# Patient Record
Sex: MALE | Race: WHITE | Employment: OTHER | URBAN - NONMETROPOLITAN AREA
[De-identification: names, ages, dates, MRNs, and addresses within clinical notes are randomized per-mention and may not be internally consistent; named-entity substitution may affect disease eponyms.]

---

## 2022-06-16 ENCOUNTER — APPOINTMENT (OUTPATIENT)
Dept: CT IMAGING | Age: 57
DRG: 377 | End: 2022-06-16
Payer: MEDICARE

## 2022-06-16 ENCOUNTER — HOSPITAL ENCOUNTER (OUTPATIENT)
Age: 57
Setting detail: OBSERVATION
Discharge: HOME | DRG: 377 | End: 2022-06-18
Attending: EMERGENCY MEDICINE | Admitting: INTERNAL MEDICINE
Payer: MEDICARE

## 2022-06-16 ENCOUNTER — APPOINTMENT (OUTPATIENT)
Dept: GENERAL RADIOLOGY | Age: 57
DRG: 377 | End: 2022-06-16
Payer: MEDICARE

## 2022-06-16 DIAGNOSIS — R10.84 GENERALIZED ABDOMINAL PAIN: Primary | ICD-10-CM

## 2022-06-16 LAB
A/G RATIO: 0.6 (ref 1.1–2.2)
ALBUMIN SERPL-MCNC: 2.9 G/DL (ref 3.4–5)
ALP BLD-CCNC: 123 U/L (ref 40–129)
ALT SERPL-CCNC: 27 U/L (ref 10–40)
AMMONIA: 80 UMOL/L (ref 16–60)
ANION GAP SERPL CALCULATED.3IONS-SCNC: 12 MMOL/L (ref 3–16)
AST SERPL-CCNC: 145 U/L (ref 15–37)
BASOPHILS ABSOLUTE: 0.1 K/UL (ref 0–0.2)
BASOPHILS RELATIVE PERCENT: 1.4 %
BILIRUB SERPL-MCNC: 1.3 MG/DL (ref 0–1)
BILIRUBIN URINE: NEGATIVE
BLOOD, URINE: NEGATIVE
BUN BLDV-MCNC: 13 MG/DL (ref 7–20)
CALCIUM SERPL-MCNC: 9.1 MG/DL (ref 8.3–10.6)
CHLORIDE BLD-SCNC: 101 MMOL/L (ref 99–110)
CLARITY: CLEAR
CO2: 25 MMOL/L (ref 21–32)
COLOR: YELLOW
CREAT SERPL-MCNC: 1.3 MG/DL (ref 0.9–1.3)
EOSINOPHILS ABSOLUTE: 0.1 K/UL (ref 0–0.6)
EOSINOPHILS RELATIVE PERCENT: 1.1 %
GFR AFRICAN AMERICAN: >60
GFR NON-AFRICAN AMERICAN: 57
GLUCOSE BLD-MCNC: 101 MG/DL (ref 70–99)
GLUCOSE URINE: NEGATIVE MG/DL
HCT VFR BLD CALC: 39.9 % (ref 40.5–52.5)
HEMOGLOBIN: 13.7 G/DL (ref 13.5–17.5)
INR BLD: 1.23 (ref 0.87–1.14)
KETONES, URINE: NEGATIVE MG/DL
LACTIC ACID: 1.6 MMOL/L (ref 0.4–2)
LEUKOCYTE ESTERASE, URINE: NEGATIVE
LIPASE: 90 U/L (ref 13–60)
LYMPHOCYTES ABSOLUTE: 1.5 K/UL (ref 1–5.1)
LYMPHOCYTES RELATIVE PERCENT: 25 %
MAGNESIUM: 1.9 MG/DL (ref 1.8–2.4)
MCH RBC QN AUTO: 35.7 PG (ref 26–34)
MCHC RBC AUTO-ENTMCNC: 34.3 G/DL (ref 31–36)
MCV RBC AUTO: 104 FL (ref 80–100)
MICROSCOPIC EXAMINATION: NORMAL
MONOCYTES ABSOLUTE: 0.7 K/UL (ref 0–1.3)
MONOCYTES RELATIVE PERCENT: 11.9 %
NEUTROPHILS ABSOLUTE: 3.7 K/UL (ref 1.7–7.7)
NEUTROPHILS RELATIVE PERCENT: 60.6 %
NITRITE, URINE: NEGATIVE
PDW BLD-RTO: 16.6 % (ref 12.4–15.4)
PH UA: 7.5 (ref 5–8)
PLATELET # BLD: 210 K/UL (ref 135–450)
PMV BLD AUTO: 8.5 FL (ref 5–10.5)
POTASSIUM SERPL-SCNC: 5.1 MMOL/L (ref 3.5–5.1)
PROTEIN UA: NEGATIVE MG/DL
PROTHROMBIN TIME: 15.4 SEC (ref 11.7–14.5)
RBC # BLD: 3.84 M/UL (ref 4.2–5.9)
SARS-COV-2, NAAT: NOT DETECTED
SODIUM BLD-SCNC: 138 MMOL/L (ref 136–145)
SPECIFIC GRAVITY UA: <=1.005 (ref 1–1.03)
TOTAL PROTEIN: 8 G/DL (ref 6.4–8.2)
URINE REFLEX TO CULTURE: NORMAL
URINE TYPE: NORMAL
UROBILINOGEN, URINE: 1 E.U./DL
WBC # BLD: 6.2 K/UL (ref 4–11)

## 2022-06-16 PROCEDURE — 6360000004 HC RX CONTRAST MEDICATION: Performed by: EMERGENCY MEDICINE

## 2022-06-16 PROCEDURE — 93005 ELECTROCARDIOGRAM TRACING: CPT | Performed by: EMERGENCY MEDICINE

## 2022-06-16 PROCEDURE — 83690 ASSAY OF LIPASE: CPT

## 2022-06-16 PROCEDURE — 96375 TX/PRO/DX INJ NEW DRUG ADDON: CPT

## 2022-06-16 PROCEDURE — 80053 COMPREHEN METABOLIC PANEL: CPT

## 2022-06-16 PROCEDURE — 6360000002 HC RX W HCPCS: Performed by: EMERGENCY MEDICINE

## 2022-06-16 PROCEDURE — 36415 COLL VENOUS BLD VENIPUNCTURE: CPT

## 2022-06-16 PROCEDURE — 85025 COMPLETE CBC W/AUTO DIFF WBC: CPT

## 2022-06-16 PROCEDURE — 81003 URINALYSIS AUTO W/O SCOPE: CPT

## 2022-06-16 PROCEDURE — 96376 TX/PRO/DX INJ SAME DRUG ADON: CPT

## 2022-06-16 PROCEDURE — 2500000003 HC RX 250 WO HCPCS: Performed by: STUDENT IN AN ORGANIZED HEALTH CARE EDUCATION/TRAINING PROGRAM

## 2022-06-16 PROCEDURE — 2580000003 HC RX 258: Performed by: EMERGENCY MEDICINE

## 2022-06-16 PROCEDURE — 96365 THER/PROPH/DIAG IV INF INIT: CPT

## 2022-06-16 PROCEDURE — 96361 HYDRATE IV INFUSION ADD-ON: CPT

## 2022-06-16 PROCEDURE — 71045 X-RAY EXAM CHEST 1 VIEW: CPT

## 2022-06-16 PROCEDURE — 87635 SARS-COV-2 COVID-19 AMP PRB: CPT

## 2022-06-16 PROCEDURE — 83735 ASSAY OF MAGNESIUM: CPT

## 2022-06-16 PROCEDURE — 96372 THER/PROPH/DIAG INJ SC/IM: CPT

## 2022-06-16 PROCEDURE — 85610 PROTHROMBIN TIME: CPT

## 2022-06-16 PROCEDURE — 82140 ASSAY OF AMMONIA: CPT

## 2022-06-16 PROCEDURE — 74177 CT ABD & PELVIS W/CONTRAST: CPT

## 2022-06-16 PROCEDURE — 83605 ASSAY OF LACTIC ACID: CPT

## 2022-06-16 PROCEDURE — 99285 EMERGENCY DEPT VISIT HI MDM: CPT

## 2022-06-16 RX ORDER — FUROSEMIDE 20 MG/1
20 TABLET ORAL 2 TIMES DAILY
Status: ON HOLD | COMMUNITY
End: 2022-06-18 | Stop reason: SDUPTHER

## 2022-06-16 RX ORDER — POLYETHYLENE GLYCOL 3350 17 G/17G
17 POWDER, FOR SOLUTION ORAL DAILY
COMMUNITY
End: 2022-06-20

## 2022-06-16 RX ORDER — ONDANSETRON 2 MG/ML
4 INJECTION INTRAMUSCULAR; INTRAVENOUS ONCE
Status: COMPLETED | OUTPATIENT
Start: 2022-06-16 | End: 2022-06-16

## 2022-06-16 RX ORDER — METHION/INOS/CHOL BT/B COM/LIV 110MG-86MG
CAPSULE ORAL
COMMUNITY

## 2022-06-16 RX ORDER — FOLIC ACID 1 MG/1
1 TABLET ORAL DAILY
COMMUNITY

## 2022-06-16 RX ORDER — SPIRONOLACTONE 50 MG/1
50 TABLET, FILM COATED ORAL DAILY
COMMUNITY

## 2022-06-16 RX ORDER — LIDOCAINE HYDROCHLORIDE AND EPINEPHRINE 10; 10 MG/ML; UG/ML
20 INJECTION, SOLUTION INFILTRATION; PERINEURAL ONCE
Status: COMPLETED | OUTPATIENT
Start: 2022-06-16 | End: 2022-06-16

## 2022-06-16 RX ORDER — 0.9 % SODIUM CHLORIDE 0.9 %
1000 INTRAVENOUS SOLUTION INTRAVENOUS ONCE
Status: COMPLETED | OUTPATIENT
Start: 2022-06-16 | End: 2022-06-17

## 2022-06-16 RX ORDER — ENTECAVIR 0.5 MG/1
1 TABLET, FILM COATED ORAL DAILY
COMMUNITY
End: 2022-06-20

## 2022-06-16 RX ORDER — MORPHINE SULFATE 4 MG/ML
4 INJECTION, SOLUTION INTRAMUSCULAR; INTRAVENOUS ONCE
Status: COMPLETED | OUTPATIENT
Start: 2022-06-16 | End: 2022-06-16

## 2022-06-16 RX ORDER — PANTOPRAZOLE SODIUM 40 MG/1
40 TABLET, DELAYED RELEASE ORAL DAILY
Status: ON HOLD | COMMUNITY
End: 2022-06-18 | Stop reason: HOSPADM

## 2022-06-16 RX ORDER — IBUPROFEN 400 MG/1
400 TABLET ORAL EVERY 6 HOURS PRN
Status: ON HOLD | COMMUNITY
End: 2022-06-18 | Stop reason: HOSPADM

## 2022-06-16 RX ORDER — OMEPRAZOLE 20 MG/1
20 CAPSULE, DELAYED RELEASE ORAL DAILY
Status: ON HOLD | COMMUNITY
End: 2022-06-22 | Stop reason: HOSPADM

## 2022-06-16 RX ORDER — MORPHINE SULFATE 4 MG/ML
4 INJECTION, SOLUTION INTRAMUSCULAR; INTRAVENOUS ONCE
Status: COMPLETED | OUTPATIENT
Start: 2022-06-17 | End: 2022-06-17

## 2022-06-16 RX ORDER — ONDANSETRON 4 MG/1
4 TABLET, FILM COATED ORAL EVERY 8 HOURS PRN
COMMUNITY
End: 2022-06-20

## 2022-06-16 RX ORDER — TENOFOVIR DISOPROXIL FUMARATE 300 MG/1
300 TABLET, FILM COATED ORAL DAILY
COMMUNITY

## 2022-06-16 RX ORDER — DOCUSATE SODIUM 100 MG/1
100 CAPSULE, LIQUID FILLED ORAL 2 TIMES DAILY
COMMUNITY
End: 2022-06-20

## 2022-06-16 RX ORDER — HYDROXYZINE PAMOATE 50 MG/1
50 CAPSULE ORAL 3 TIMES DAILY PRN
COMMUNITY
End: 2022-06-20

## 2022-06-16 RX ADMIN — LIDOCAINE HYDROCHLORIDE,EPINEPHRINE BITARTRATE 20 ML: 10; .01 INJECTION, SOLUTION INFILTRATION; PERINEURAL at 22:29

## 2022-06-16 RX ADMIN — IOPAMIDOL 75 ML: 755 INJECTION, SOLUTION INTRAVENOUS at 18:36

## 2022-06-16 RX ADMIN — ONDANSETRON HYDROCHLORIDE 4 MG: 2 INJECTION, SOLUTION INTRAMUSCULAR; INTRAVENOUS at 18:00

## 2022-06-16 RX ADMIN — SODIUM CHLORIDE 1000 ML: 9 INJECTION, SOLUTION INTRAVENOUS at 18:00

## 2022-06-16 RX ADMIN — MORPHINE SULFATE 4 MG: 4 INJECTION INTRAVENOUS at 18:00

## 2022-06-16 RX ADMIN — CEFTRIAXONE SODIUM 1000 MG: 1 INJECTION, POWDER, FOR SOLUTION INTRAMUSCULAR; INTRAVENOUS at 22:28

## 2022-06-16 ASSESSMENT — PAIN SCALES - GENERAL
PAINLEVEL_OUTOF10: 6
PAINLEVEL_OUTOF10: 7
PAINLEVEL_OUTOF10: 8

## 2022-06-16 ASSESSMENT — PAIN DESCRIPTION - LOCATION
LOCATION: ABDOMEN

## 2022-06-16 ASSESSMENT — PAIN DESCRIPTION - FREQUENCY
FREQUENCY: CONTINUOUS

## 2022-06-16 ASSESSMENT — PAIN - FUNCTIONAL ASSESSMENT: PAIN_FUNCTIONAL_ASSESSMENT: 0-10

## 2022-06-16 NOTE — ED NOTES
Patient state that he is on about 10 meds for his liver, but does not know what they are.  Heis not from this area, and his pharmacy is not local.     Jimmy Colvin RN  06/16/22 4786

## 2022-06-17 LAB
A/G RATIO: 0.8 (ref 1.1–2.2)
ALBUMIN SERPL-MCNC: 3.2 G/DL (ref 3.4–5)
ALP BLD-CCNC: 121 U/L (ref 40–129)
ALT SERPL-CCNC: 25 U/L (ref 10–40)
ANION GAP SERPL CALCULATED.3IONS-SCNC: 12 MMOL/L (ref 3–16)
AST SERPL-CCNC: 114 U/L (ref 15–37)
BASOPHILS ABSOLUTE: 0.1 K/UL (ref 0–0.2)
BASOPHILS RELATIVE PERCENT: 2.1 %
BILIRUB SERPL-MCNC: 1.4 MG/DL (ref 0–1)
BUN BLDV-MCNC: 10 MG/DL (ref 7–20)
CALCIUM SERPL-MCNC: 8.8 MG/DL (ref 8.3–10.6)
CHLORIDE BLD-SCNC: 102 MMOL/L (ref 99–110)
CO2: 23 MMOL/L (ref 21–32)
CREAT SERPL-MCNC: 1.2 MG/DL (ref 0.9–1.3)
EKG ATRIAL RATE: 91 BPM
EKG DIAGNOSIS: NORMAL
EKG P AXIS: 31 DEGREES
EKG P-R INTERVAL: 154 MS
EKG Q-T INTERVAL: 400 MS
EKG QRS DURATION: 86 MS
EKG QTC CALCULATION (BAZETT): 492 MS
EKG R AXIS: -44 DEGREES
EKG T AXIS: 26 DEGREES
EKG VENTRICULAR RATE: 91 BPM
EOSINOPHILS ABSOLUTE: 0.1 K/UL (ref 0–0.6)
EOSINOPHILS RELATIVE PERCENT: 1.8 %
GFR AFRICAN AMERICAN: >60
GFR NON-AFRICAN AMERICAN: >60
GLUCOSE BLD-MCNC: 115 MG/DL (ref 70–99)
HCT VFR BLD CALC: 39.2 % (ref 40.5–52.5)
HEMOGLOBIN: 13.2 G/DL (ref 13.5–17.5)
LACTIC ACID: 1.9 MMOL/L (ref 0.4–2)
LYMPHOCYTES ABSOLUTE: 1.7 K/UL (ref 1–5.1)
LYMPHOCYTES RELATIVE PERCENT: 31.3 %
MAGNESIUM: 2 MG/DL (ref 1.8–2.4)
MCH RBC QN AUTO: 35.5 PG (ref 26–34)
MCHC RBC AUTO-ENTMCNC: 33.7 G/DL (ref 31–36)
MCV RBC AUTO: 105.2 FL (ref 80–100)
MONOCYTES ABSOLUTE: 0.6 K/UL (ref 0–1.3)
MONOCYTES RELATIVE PERCENT: 11.5 %
NEUTROPHILS ABSOLUTE: 3 K/UL (ref 1.7–7.7)
NEUTROPHILS RELATIVE PERCENT: 53.3 %
PDW BLD-RTO: 16.6 % (ref 12.4–15.4)
PLATELET # BLD: 222 K/UL (ref 135–450)
PMV BLD AUTO: 7.9 FL (ref 5–10.5)
POTASSIUM REFLEX MAGNESIUM: 3.5 MMOL/L (ref 3.5–5.1)
RBC # BLD: 3.72 M/UL (ref 4.2–5.9)
SODIUM BLD-SCNC: 137 MMOL/L (ref 136–145)
TOTAL PROTEIN: 7.3 G/DL (ref 6.4–8.2)
TROPONIN: <0.01 NG/ML
WBC # BLD: 5.6 K/UL (ref 4–11)

## 2022-06-17 PROCEDURE — 6370000000 HC RX 637 (ALT 250 FOR IP): Performed by: NURSE PRACTITIONER

## 2022-06-17 PROCEDURE — 6370000000 HC RX 637 (ALT 250 FOR IP): Performed by: INTERNAL MEDICINE

## 2022-06-17 PROCEDURE — 96376 TX/PRO/DX INJ SAME DRUG ADON: CPT

## 2022-06-17 PROCEDURE — 86707 HEPATITIS BE ANTIBODY: CPT

## 2022-06-17 PROCEDURE — 99220 PR INITIAL OBSERVATION CARE/DAY 70 MINUTES: CPT | Performed by: INTERNAL MEDICINE

## 2022-06-17 PROCEDURE — 6360000002 HC RX W HCPCS: Performed by: INTERNAL MEDICINE

## 2022-06-17 PROCEDURE — 6360000002 HC RX W HCPCS: Performed by: NURSE PRACTITIONER

## 2022-06-17 PROCEDURE — 87341 HEP B SURFACE AG NEUTRLZJ IA: CPT

## 2022-06-17 PROCEDURE — 96367 TX/PROPH/DG ADDL SEQ IV INF: CPT

## 2022-06-17 PROCEDURE — 87350 HEPATITIS BE AG IA: CPT

## 2022-06-17 PROCEDURE — 36415 COLL VENOUS BLD VENIPUNCTURE: CPT

## 2022-06-17 PROCEDURE — 83605 ASSAY OF LACTIC ACID: CPT

## 2022-06-17 PROCEDURE — 6360000002 HC RX W HCPCS: Performed by: EMERGENCY MEDICINE

## 2022-06-17 PROCEDURE — 96366 THER/PROPH/DIAG IV INF ADDON: CPT

## 2022-06-17 PROCEDURE — 2580000003 HC RX 258: Performed by: INTERNAL MEDICINE

## 2022-06-17 PROCEDURE — 87517 HEPATITIS B DNA QUANT: CPT

## 2022-06-17 PROCEDURE — 87340 HEPATITIS B SURFACE AG IA: CPT

## 2022-06-17 PROCEDURE — G0378 HOSPITAL OBSERVATION PER HR: HCPCS

## 2022-06-17 PROCEDURE — 86706 HEP B SURFACE ANTIBODY: CPT

## 2022-06-17 PROCEDURE — 96372 THER/PROPH/DIAG INJ SC/IM: CPT

## 2022-06-17 PROCEDURE — 80053 COMPREHEN METABOLIC PANEL: CPT

## 2022-06-17 PROCEDURE — 83735 ASSAY OF MAGNESIUM: CPT

## 2022-06-17 PROCEDURE — 2580000003 HC RX 258: Performed by: NURSE PRACTITIONER

## 2022-06-17 PROCEDURE — 93010 ELECTROCARDIOGRAM REPORT: CPT | Performed by: INTERNAL MEDICINE

## 2022-06-17 PROCEDURE — 87522 HEPATITIS C REVRS TRNSCRPJ: CPT

## 2022-06-17 PROCEDURE — 85025 COMPLETE CBC W/AUTO DIFF WBC: CPT

## 2022-06-17 PROCEDURE — 84484 ASSAY OF TROPONIN QUANT: CPT

## 2022-06-17 RX ORDER — ACETAMINOPHEN 650 MG/1
650 SUPPOSITORY RECTAL EVERY 6 HOURS PRN
Status: DISCONTINUED | OUTPATIENT
Start: 2022-06-17 | End: 2022-06-18 | Stop reason: HOSPADM

## 2022-06-17 RX ORDER — ACETAMINOPHEN 325 MG/1
650 TABLET ORAL EVERY 6 HOURS PRN
Status: DISCONTINUED | OUTPATIENT
Start: 2022-06-17 | End: 2022-06-18 | Stop reason: HOSPADM

## 2022-06-17 RX ORDER — POTASSIUM CHLORIDE 7.45 MG/ML
10 INJECTION INTRAVENOUS PRN
Status: DISCONTINUED | OUTPATIENT
Start: 2022-06-17 | End: 2022-06-18 | Stop reason: HOSPADM

## 2022-06-17 RX ORDER — FOLIC ACID 1 MG/1
1 TABLET ORAL DAILY
Status: DISCONTINUED | OUTPATIENT
Start: 2022-06-17 | End: 2022-06-18 | Stop reason: HOSPADM

## 2022-06-17 RX ORDER — PROMETHAZINE HYDROCHLORIDE 25 MG/1
12.5 TABLET ORAL EVERY 6 HOURS PRN
Status: DISCONTINUED | OUTPATIENT
Start: 2022-06-17 | End: 2022-06-18 | Stop reason: HOSPADM

## 2022-06-17 RX ORDER — MAGNESIUM SULFATE IN WATER 40 MG/ML
2000 INJECTION, SOLUTION INTRAVENOUS PRN
Status: DISCONTINUED | OUTPATIENT
Start: 2022-06-17 | End: 2022-06-18 | Stop reason: HOSPADM

## 2022-06-17 RX ORDER — SODIUM CHLORIDE 0.9 % (FLUSH) 0.9 %
10 SYRINGE (ML) INJECTION PRN
Status: DISCONTINUED | OUTPATIENT
Start: 2022-06-17 | End: 2022-06-18 | Stop reason: HOSPADM

## 2022-06-17 RX ORDER — LACTULOSE 10 G/15ML
20 SOLUTION ORAL 3 TIMES DAILY
Status: DISCONTINUED | OUTPATIENT
Start: 2022-06-17 | End: 2022-06-18 | Stop reason: HOSPADM

## 2022-06-17 RX ORDER — OXYCODONE HYDROCHLORIDE 5 MG/1
10 TABLET ORAL EVERY 4 HOURS PRN
Status: COMPLETED | OUTPATIENT
Start: 2022-06-17 | End: 2022-06-17

## 2022-06-17 RX ORDER — SODIUM CHLORIDE 0.9 % (FLUSH) 0.9 %
10 SYRINGE (ML) INJECTION EVERY 12 HOURS SCHEDULED
Status: DISCONTINUED | OUTPATIENT
Start: 2022-06-17 | End: 2022-06-18 | Stop reason: HOSPADM

## 2022-06-17 RX ORDER — LANOLIN ALCOHOL/MO/W.PET/CERES
100 CREAM (GRAM) TOPICAL DAILY
Status: DISCONTINUED | OUTPATIENT
Start: 2022-06-17 | End: 2022-06-18 | Stop reason: HOSPADM

## 2022-06-17 RX ORDER — ENOXAPARIN SODIUM 100 MG/ML
40 INJECTION SUBCUTANEOUS DAILY
Status: DISCONTINUED | OUTPATIENT
Start: 2022-06-17 | End: 2022-06-18 | Stop reason: HOSPADM

## 2022-06-17 RX ORDER — ONDANSETRON 2 MG/ML
4 INJECTION INTRAMUSCULAR; INTRAVENOUS EVERY 6 HOURS PRN
Status: DISCONTINUED | OUTPATIENT
Start: 2022-06-17 | End: 2022-06-18 | Stop reason: HOSPADM

## 2022-06-17 RX ORDER — PANTOPRAZOLE SODIUM 40 MG/1
40 TABLET, DELAYED RELEASE ORAL
Status: DISCONTINUED | OUTPATIENT
Start: 2022-06-17 | End: 2022-06-18 | Stop reason: HOSPADM

## 2022-06-17 RX ORDER — ENTECAVIR 0.5 MG/1
1 TABLET, FILM COATED ORAL EVERY 24 HOURS
Status: DISCONTINUED | OUTPATIENT
Start: 2022-06-17 | End: 2022-06-18 | Stop reason: HOSPADM

## 2022-06-17 RX ORDER — SODIUM CHLORIDE 9 MG/ML
INJECTION, SOLUTION INTRAVENOUS PRN
Status: DISCONTINUED | OUTPATIENT
Start: 2022-06-17 | End: 2022-06-18 | Stop reason: HOSPADM

## 2022-06-17 RX ORDER — POLYETHYLENE GLYCOL 3350 17 G/17G
17 POWDER, FOR SOLUTION ORAL DAILY
Status: DISCONTINUED | OUTPATIENT
Start: 2022-06-17 | End: 2022-06-18 | Stop reason: HOSPADM

## 2022-06-17 RX ORDER — ALBUTEROL SULFATE 90 UG/1
2 AEROSOL, METERED RESPIRATORY (INHALATION) EVERY 6 HOURS PRN
Status: DISCONTINUED | OUTPATIENT
Start: 2022-06-17 | End: 2022-06-18 | Stop reason: HOSPADM

## 2022-06-17 RX ORDER — HYDROXYZINE 50 MG/1
50 TABLET, FILM COATED ORAL 3 TIMES DAILY PRN
Status: DISCONTINUED | OUTPATIENT
Start: 2022-06-17 | End: 2022-06-18 | Stop reason: HOSPADM

## 2022-06-17 RX ORDER — TENOFOVIR DISOPROXIL FUMARATE 300 MG/1
300 TABLET, FILM COATED ORAL DAILY
Status: DISCONTINUED | OUTPATIENT
Start: 2022-06-17 | End: 2022-06-18 | Stop reason: HOSPADM

## 2022-06-17 RX ORDER — SPIRONOLACTONE 25 MG/1
50 TABLET ORAL DAILY
Status: DISCONTINUED | OUTPATIENT
Start: 2022-06-17 | End: 2022-06-18 | Stop reason: HOSPADM

## 2022-06-17 RX ORDER — OXYCODONE HYDROCHLORIDE 5 MG/1
5 TABLET ORAL EVERY 4 HOURS PRN
Status: COMPLETED | OUTPATIENT
Start: 2022-06-17 | End: 2022-06-18

## 2022-06-17 RX ORDER — FUROSEMIDE 20 MG/1
20 TABLET ORAL 2 TIMES DAILY
Status: DISCONTINUED | OUTPATIENT
Start: 2022-06-17 | End: 2022-06-18 | Stop reason: HOSPADM

## 2022-06-17 RX ADMIN — SPIRONOLACTONE 50 MG: 25 TABLET ORAL at 10:43

## 2022-06-17 RX ADMIN — ENOXAPARIN SODIUM 40 MG: 100 INJECTION SUBCUTANEOUS at 10:43

## 2022-06-17 RX ADMIN — POLYETHYLENE GLYCOL (3350) 17 G: 17 POWDER, FOR SOLUTION ORAL at 10:42

## 2022-06-17 RX ADMIN — MORPHINE SULFATE 4 MG: 4 INJECTION INTRAVENOUS at 00:05

## 2022-06-17 RX ADMIN — Medication 100 MG: at 10:44

## 2022-06-17 RX ADMIN — HYDROXYZINE HYDROCHLORIDE 50 MG: 50 TABLET, FILM COATED ORAL at 10:52

## 2022-06-17 RX ADMIN — FUROSEMIDE 20 MG: 20 TABLET ORAL at 10:44

## 2022-06-17 RX ADMIN — CEFTRIAXONE SODIUM 1000 MG: 1 INJECTION, POWDER, FOR SOLUTION INTRAMUSCULAR; INTRAVENOUS at 21:39

## 2022-06-17 RX ADMIN — AZITHROMYCIN 500 MG: 500 INJECTION, POWDER, LYOPHILIZED, FOR SOLUTION INTRAVENOUS at 12:51

## 2022-06-17 RX ADMIN — LACTULOSE 20 G: 10 SOLUTION ORAL at 21:30

## 2022-06-17 RX ADMIN — ONDANSETRON HYDROCHLORIDE 4 MG: 2 INJECTION, SOLUTION INTRAMUSCULAR; INTRAVENOUS at 17:38

## 2022-06-17 RX ADMIN — Medication 10 ML: at 08:50

## 2022-06-17 RX ADMIN — ENTECAVIR 1 MG: 0.5 TABLET ORAL at 10:44

## 2022-06-17 RX ADMIN — Medication 10 ML: at 21:33

## 2022-06-17 RX ADMIN — FOLIC ACID 1 MG: 1 TABLET ORAL at 10:44

## 2022-06-17 RX ADMIN — OXYCODONE 10 MG: 5 TABLET ORAL at 12:47

## 2022-06-17 RX ADMIN — OXYCODONE 10 MG: 5 TABLET ORAL at 17:37

## 2022-06-17 RX ADMIN — LACTULOSE 20 G: 10 SOLUTION ORAL at 14:55

## 2022-06-17 RX ADMIN — FUROSEMIDE 20 MG: 20 TABLET ORAL at 21:32

## 2022-06-17 RX ADMIN — OXYCODONE 10 MG: 5 TABLET ORAL at 08:50

## 2022-06-17 RX ADMIN — PANTOPRAZOLE SODIUM 40 MG: 40 TABLET, DELAYED RELEASE ORAL at 10:44

## 2022-06-17 RX ADMIN — OXYCODONE 5 MG: 5 TABLET ORAL at 21:32

## 2022-06-17 ASSESSMENT — ENCOUNTER SYMPTOMS
DIARRHEA: 0
NAUSEA: 0
PHOTOPHOBIA: 0
SHORTNESS OF BREATH: 0
RHINORRHEA: 0
BACK PAIN: 0
VOMITING: 0
WHEEZING: 0
ABDOMINAL PAIN: 1
COUGH: 0

## 2022-06-17 ASSESSMENT — PAIN DESCRIPTION - LOCATION
LOCATION: ABDOMEN

## 2022-06-17 ASSESSMENT — PAIN SCALES - GENERAL
PAINLEVEL_OUTOF10: 7
PAINLEVEL_OUTOF10: 8
PAINLEVEL_OUTOF10: 7
PAINLEVEL_OUTOF10: 4
PAINLEVEL_OUTOF10: 7

## 2022-06-17 ASSESSMENT — PAIN DESCRIPTION - ORIENTATION: ORIENTATION: MID

## 2022-06-17 ASSESSMENT — PAIN - FUNCTIONAL ASSESSMENT: PAIN_FUNCTIONAL_ASSESSMENT: ACTIVITIES ARE NOT PREVENTED

## 2022-06-17 ASSESSMENT — PAIN DESCRIPTION - DESCRIPTORS: DESCRIPTORS: ACHING

## 2022-06-17 NOTE — PROGRESS NOTES
Spoke with Rosalind Díaz (Admissions) at [facility]. She states when patient is ready for discharge to call her at [phone] and she will set up transportation.  Rosalind Díaz will be available on Saturday 06/18 beginning at 0700

## 2022-06-17 NOTE — ED NOTES
Dr. Efrain Mohr attempted to do paracentesis without success. Plan to admit at this time. Waiting on call back.      Susie Nelson RN  06/16/22 3711

## 2022-06-17 NOTE — ED PROVIDER NOTES
Emergency Department Provider Note  Location: [place],4Th Floor  6/16/2022     Patient Identification  Elba Madrid is a 56 y.o. male    Chief Complaint  Abdominal Pain (Patient states that he has had abdominal pain x 3 weeks. He had a paracentesis 1 week ago in [place]. Pain and nausea have increaced over the past few days.)          HPI  (History provided by patient)  Patient is a 63-year-old male with history of cirrhosis secondary to hepatitis C, reportedly prior drinker who obtains his medical care in [place] [address]. who presents for generalized abdominal pain worse over the past 48 hours. Patient reports that he was recently hospitalized in [place] for ascites and had multiple liters of fluid drained from his abdomen which is a new procedure for him. Reports that he had a mild discomfort over the past week but significantly worse over the past few days. Constant achy pain no exacerbating alleviating factors. Denies any fevers or chills nausea or vomiting. Denies any coffee-ground emesis black bloody or tarry stools. I have reviewed the following nursing documentation:  Allergies: No Known Allergies    Past medical history:  has a past medical history of Cirrhosis ([facility] [address] and Hepatitis C. Past surgical history:  has a past surgical history that includes Appendectomy; Tonsillectomy; Cholecystectomy; and Leg amputation below knee (Right). Home medications:   Prior to Admission medications    Medication Sig Start Date End Date Taking?  Authorizing Provider   omeprazole (PRILOSEC) 20 MG delayed release capsule Take 20 mg by mouth daily   Yes Historical Provider, MD   tenofovir disoproxil fumarate (VIREAD) 300 MG tablet Take 300 mg by mouth daily   Yes Historical Provider, MD   entecavir (BARACLUDE) 0.5 MG tablet Take 1 mg by mouth daily   Yes Historical Provider, MD   furosemide (LASIX) 20 MG tablet Take 20 mg by mouth 2 times daily   Yes Historical Provider, MD pantoprazole (PROTONIX) 40 MG tablet Take 40 mg by mouth daily   Yes Historical Provider, MD   folic acid (FOLVITE) 1 MG tablet Take 1 mg by mouth daily   Yes Historical Provider, MD   spironolactone (ALDACTONE) 50 MG tablet Take 50 mg by mouth daily   Yes Historical Provider, MD   Thiamine HCl (B-1) 100 MG TABS Take by mouth   Yes Historical Provider, MD   polyethylene glycol (MIRALAX) 17 g PACK packet Take 17 g by mouth daily   Yes Historical Provider, MD   ibuprofen (ADVIL;MOTRIN) 400 MG tablet Take 400 mg by mouth every 6 hours as needed for Pain   Yes Historical Provider, MD   Alum & Mag Hydroxide-Simeth (MINTOX EXTRA STRENGTH PO) Take by mouth   Yes Historical Provider, MD   hydrOXYzine pamoate (VISTARIL) 50 MG capsule Take 50 mg by mouth 3 times daily as needed for Itching   Yes Historical Provider, MD   magnesium hydroxide (MILK OF MAGNESIA) 400 MG/5ML suspension Take by mouth daily as needed for Constipation   Yes Historical Provider, MD   ondansetron (ZOFRAN) 4 MG tablet Take 4 mg by mouth every 8 hours as needed for Nausea or Vomiting   Yes Historical Provider, MD   docusate sodium (COLACE) 100 MG capsule Take 100 mg by mouth 2 times daily   Yes Historical Provider, MD       Social history:  reports that he has been smoking. He has been smoking about 1.00 pack per day. He has never used smokeless tobacco. He reports previous alcohol use. He reports current drug use. Drug: Marijuana Soni Kales). Family history:  History reviewed. No pertinent family history. ROS  Review of Systems   Constitutional: Negative for chills and fever. HENT: Negative for congestion and rhinorrhea. Eyes: Negative for photophobia and visual disturbance. Respiratory: Negative for cough, shortness of breath and wheezing. Cardiovascular: Negative for chest pain and palpitations. Gastrointestinal: Positive for abdominal pain. Negative for diarrhea, nausea and vomiting.    Genitourinary: Negative for dysuria and hematuria. Musculoskeletal: Negative for back pain and neck pain. Skin: Negative for rash and wound. Neurological: Negative for syncope and weakness. Psychiatric/Behavioral: Negative for agitation and confusion. Exam  ED Triage Vitals [06/16/22 1650]   BP Temp Temp Source Heart Rate Resp SpO2 Height Weight   136/89 98.2 °F (36.8 °C) Oral 99 18 97 % 5' 10\" (1.778 m) 195 lb (88.5 kg)       Physical Exam  Vitals and nursing note reviewed. Constitutional:       General: He is not in acute distress. Appearance: He is well-developed. HENT:      Head: Normocephalic and atraumatic. Nose: Nose normal. No congestion. Eyes:      Extraocular Movements: Extraocular movements intact. Pupils: Pupils are equal, round, and reactive to light. Cardiovascular:      Rate and Rhythm: Normal rate and regular rhythm. Heart sounds: No murmur heard. Pulmonary:      Effort: Pulmonary effort is normal.      Breath sounds: Normal breath sounds. Abdominal:      Palpations: Abdomen is soft. Comments: Questionably distended, generally tender mild guarding. There is no rebound or CVA tenderness   Musculoskeletal:         General: No deformity. Normal range of motion. Cervical back: Normal range of motion and neck supple. Right lower leg: No edema. Left lower leg: No edema. Skin:     General: Skin is warm. Findings: No rash. Neurological:      Mental Status: He is alert and oriented to person, place, and time. Motor: No abnormal muscle tone.       Coordination: Coordination normal.   Psychiatric:         Mood and Affect: Mood normal.         Behavior: Behavior normal.           ED Course    ED Medication Orders (From admission, onward)    Start Ordered     Status Ordering Provider    06/17/22 0015 06/16/22 2349  morphine sulfate (PF) injection 4 mg  ONCE         Last MAR action: Given - by Dave Loo on 06/17/22 at [address] [facility], [address] [facility] Marky CALIX    06/16/22 2725 06/16/22 2222  cefTRIAXone (ROCEPHIN) 1000 mg IVPB in 50 mL D5W minibag  ONCE        Question:  Antimicrobial Indications  Answer:  Intra-Abdominal Infection    Last MAR action: Stopped - by Megha Mchugh on 06/16/22 at [address] [facility], DEL[place] L    06/16/22 2200 06/16/22 2143  lidocaine-EPINEPHrine 1 %-1:[id number] injection 20 mL  ONCE         Last MAR action: Given - by Megah Mchugh on 06/16/22 at [address] [facility], [address]  [place]    06/16/22 1817 06/16/22 1817  iopamidol (ISOVUE-370) 76 % injection 75 mL  IMG ONCE PRN         Last MAR action: Given - by Lou Vides on 06/16/22 at [facility]    06/16/22 1815 06/16/22 1754  0.9 % sodium chloride bolus  ONCE         Last MAR action: Stopped - by Megha Mchugh on 06/17/22 at [address] [facility], [address] [place] [address] Drive    06/16/22 1815 06/16/22 1754  morphine sulfate (PF) injection 4 mg  ONCE         Last MAR action: Given - by Jesenia Heller on 06/16/22 at [facility]    06/16/22 1815 06/16/22 1754  ondansetron (ZOFRAN) injection 4 mg  ONCE         Last MAR action: Given - by Jesenia Heller on 06/16/22 at [address], [address]          EKG  Normal sinus rhythm rate of 90 left axis deviation QTC prolonged 492 otherwise normal intervals no diagnostic ischemic changes noted      Radiology  CT ABDOMEN PELVIS W IV CONTRAST Additional Contrast? None    Result Date: 6/16/2022  EXAMINATION: CT OF THE ABDOMEN AND PELVIS WITH CONTRAST, 6/16/2022 6:16 pm TECHNIQUE: CT of the abdomen and pelvis was performed with the administration of intravenous contrast. Multiplanar reformatted images are provided for review. Automated exposure control, iterative reconstruction, and/or weight based adjustment of the mA/kV was utilized to reduce the radiation dose to as low as reasonably achievable. COMPARISON: None HISTORY: ORDERING SYSTEM PROVIDED HISTORY:  Of her cirrhosis with possible ascites. TECHNOLOGIST PROVIDED HISTORY: Additional Contrast?  None Reason for Exam:  Of her cirrhosis with possible ascites.  Decision Support Exception - unselect if not a suspected or confirmed emergency medical condition->Emergency Medical Condition (MA) Reason for Exam:  Abd pain FINDINGS: Lower Chest: Emphysema. Infiltrate in the region of the lingula consistent with pneumonia. Mild atelectatic changes. Organs: The liver demonstrates diffuse fatty infiltration but no focal disease. Status post cholecystectomy. Pancreas and adrenals, aorta and IVC appear stable. Mild left nephrolithiasis but no obstructive uropathy. Aorta is calcified but nonaneurysmal.  Splenomegaly with the spleen measuring 17 cm. GI/Bowel: No evidence of bowel obstruction or perforation. Diverticulosis mostly in the sigmoid but no evidence of acute diverticulitis. Pelvis: Urinary bladder, prostate and seminal vesicles appear unremarkable. No evidence of inguinal hernia or lymphadenopathy. Peritoneum/Retroperitoneum: No evidence of retroperitoneal lymphadenopathy or acute mesenteric findings. Bones/Soft Tissues: No acute abnormality. 1. Lingular infiltrate consistent with possible pneumonia. 2. Fatty infiltration but no focal disease. Splenomegaly. 3. Extensive diverticulosis worse in the sigmoid but no acute diverticulitis. No evidence of bowel obstruction. XR CHEST PORTABLE    Result Date: 6/16/2022  EXAMINATION: ONE XRAY VIEW OF THE CHEST 6/16/2022 6:14 pm COMPARISON: None. HISTORY: ORDERING SYSTEM PROVIDED HISTORY: PAIN TECHNOLOGIST PROVIDED HISTORY: Reason for exam:->PAIN Reason for Exam: abd pain FINDINGS: The cardiomediastinal silhouette is unremarkable. The lungs are clear. No infiltrate, pleural fluid or focal process is identified. Osseous structures are unremarkable. No acute cardiopulmonary disease.          Labs  Results for orders placed or performed during the hospital encounter of 06/16/22   COVID-19, Rapid    Specimen: Nasopharyngeal Swab   Result Value Ref Range    SARS-CoV-2, NAAT Not Detected Not Detected   CBC with Auto Differential   Result Value Ref Range    WBC 6.2 4.0 - 11.0 K/uL    RBC 3.84 (L) 4.20 - 5.90 M/uL    Hemoglobin 13.7 13.5 - 17.5 g/dL    Hematocrit 39.9 (L) 40.5 - 52.5 %    .0 (H) 80.0 - 100.0 fL    MCH 35.7 (H) 26.0 - 34.0 pg    MCHC 34.3 31.0 - 36.0 g/dL    RDW 16.6 (H) 12.4 - 15.4 %    Platelets 529 734 - 047 K/uL    MPV 8.5 5.0 - 10.5 fL    Neutrophils % 60.6 %    Lymphocytes % 25.0 %    Monocytes % 11.9 %    Eosinophils % 1.1 %    Basophils % 1.4 %    Neutrophils Absolute 3.7 1.7 - 7.7 K/uL    Lymphocytes Absolute 1.5 1.0 - 5.1 K/uL    Monocytes Absolute 0.7 0.0 - 1.3 K/uL    Eosinophils Absolute 0.1 0.0 - 0.6 K/uL    Basophils Absolute 0.1 0.0 - 0.2 K/uL   Comprehensive Metabolic Panel   Result Value Ref Range    Sodium 138 136 - 145 mmol/L    Potassium 5.1 3.5 - 5.1 mmol/L    Chloride 101 99 - 110 mmol/L    CO2 25 21 - 32 mmol/L    Anion Gap 12 3 - 16    Glucose 101 (H) 70 - 99 mg/dL    BUN 13 7 - 20 mg/dL    CREATININE 1.3 0.9 - 1.3 mg/dL    GFR Non- 57 (A) >60    GFR African American >60 >60    Calcium 9.1 8.3 - 10.6 mg/dL    Total Protein 8.0 6.4 - 8.2 g/dL    Albumin 2.9 (L) 3.4 - 5.0 g/dL    Albumin/Globulin Ratio 0.6 (L) 1.1 - 2.2    Total Bilirubin 1.3 (H) 0.0 - 1.0 mg/dL    Alkaline Phosphatase 123 40 - 129 U/L    ALT 27 10 - 40 U/L     (H) 15 - 37 U/L   Protime-INR   Result Value Ref Range    Protime 15.4 (H) 11.7 - 14.5 sec    INR 1.23 (H) 0.87 - 1.14   Lipase   Result Value Ref Range    Lipase 90.0 (H) 13.0 - 60.0 U/L   Lactic Acid   Result Value Ref Range    Lactic Acid 1.6 0.4 - 2.0 mmol/L   Magnesium   Result Value Ref Range    Magnesium 1.90 1.80 - 2.40 mg/dL   Ammonia   Result Value Ref Range    Ammonia 80 (H) 16 - 60 umol/L   Urinalysis with Reflex to Culture    Specimen: Urine, clean catch   Result Value Ref Range    Color, UA Yellow Straw/Yellow    Clarity, UA Clear Clear    Glucose, Ur Negative Negative mg/dL    Bilirubin Urine Negative Negative    Ketones, Urine Negative Negative mg/dL    Specific Gravity, UA <=1.005 1.005 - 1.030    Blood, Urine Negative Negative    pH, UA 7.5 5.0 - 8.0    Protein, UA Negative Negative mg/dL    Urobilinogen, Urine 1.0 <2.0 E.U./dL    Nitrite, Urine Negative Negative    Leukocyte Esterase, Urine Negative Negative    Microscopic Examination Not Indicated     Urine Type NotGiven     Urine Reflex to Culture Not Indicated    EKG 12 Lead   Result Value Ref Range    Ventricular Rate 91 BPM    Atrial Rate 91 BPM    P-R Interval 154 ms    QRS Duration 86 ms    Q-T Interval 400 ms    QTc Calculation (Bazett) 492 ms    P Axis 31 degrees    R Axis -44 degrees    T Axis 26 degrees    Diagnosis       Normal sinus rhythmLeft axis deviationProlonged QTAbnormal ECGNo previous ECGs available         MDM  Patient seen and evaluated. Relevant records reviewed. 82-year-old male who presents with generalized abdominal pain after recent therapeutic paracentesis secondary to liver cirrhosis. On exam patient is uncomfortable but nontoxic his vital signs are overall reassuring. His exam is notable for possible abdominal distention but no obvious ascites and is generally tender. His labs are without significant derangement other than consistent with mildly decompensated liver failure. No evidence of ascites on CT scan. There is small amount of abdominal fluid on bedside ultrasound. We did attempt to aspirate abdominal fluid on diagnostic paracentesis to assess for SBP, unfortunately unable to obtain a specimen. Given his tenderness I am concerned for SBP is a possibility and I am starting him on broad-spectrum antibiotics. May benefit from further observation and possibly IR procedure if warranted. Of note his chest x-ray shows a questionable lingular infiltrate however he denies any cough. Plan to admit to [facility] for further management. Clinical Impression:  1. Generalized abdominal pain          Disposition:  Admit to med/surg floor in stable condition.     Blood pressure 94/67, pulse 85, temperature 98.2 °F (36.8 °C), temperature source Oral, resp. rate 16, height 5' 10\" (1.778 m), weight 195 lb (88.5 kg), SpO2 92 %. Patient was given scripts for the following medications. I counseled patient how to take these medications. New Prescriptions    No medications on file       Disposition referral (if applicable):  No follow-up provider specified. Total critical care time is 0 minutes, which excludes separately billable procedures and updating family. Time spent is specifically for management of the presenting complaint and symptoms initially, direct bedside care, reevaluation, review of records, and consultation. There was a high probability of clinically significant life-threatening deterioration in the patient's condition, which required my urgent intervention. This chart was generated in part by using Dragon Dictation system and may contain errors related to that system including errors in grammar, punctuation, and spelling, as well as words and phrases that may be inappropriate. If there are any questions or concerns please feel free to contact the dictating provider for clarification.      Chino Jones MD  [address] Guerrero MD  06/17/22 0126

## 2022-06-17 NOTE — PLAN OF CARE
Problem: Discharge Planning  Goal: Discharge to home or other facility with appropriate resources  Outcome: Progressing  Flowsheets  Taken 6/17/2022 0248  Discharge to home or other facility with appropriate resources: Identify barriers to discharge with patient and caregiver  Taken 6/17/2022 0246  Discharge to home or other facility with appropriate resources: Identify barriers to discharge with patient and caregiver     Problem: Pain  Goal: Verbalizes/displays adequate comfort level or baseline comfort level  Outcome: Progressing     Problem: Skin/Tissue Integrity  Goal: Absence of new skin breakdown  Description: 1. Monitor for areas of redness and/or skin breakdown  2. Assess vascular access sites hourly  3. Every 4-6 hours minimum:  Change oxygen saturation probe site  4. Every 4-6 hours:  If on nasal continuous positive airway pressure, respiratory therapy assess nares and determine need for appliance change or resting period.   Outcome: Progressing     Problem: Safety - Adult  Goal: Free from fall injury  Outcome: Progressing

## 2022-06-17 NOTE — PROGRESS NOTES
Pt a/o. Am assessment completed see flow sheet. Pt denies any pain/ needs at this time. Call light within reach. Will continue to monitor. Patient refusing hospital gown at this time, states he takes himself to the bathroom and wants his home clothes on. Patient calm and cooperative at this time. Patient is not able to demonstrated the ability to move from a reclining position to an upright position within the recliner. however patient is alert, oriented and able to provide informed consent     Bedside Mobility Assessment Tool (BMAT):     Assessment Level 1- Sit and Shake    1. From a semi-reclined position, ask patient to sit up and rotate to a seated position at the side of the bed. Can use the bedrail. 2. Ask patient to reach out and grab your hand and shake making sure patient reaches across his/her midline. Pass- Patient is able to come to a seated position, maintain core strength. Maintains seated balance while reaching across midline. Move on to Assessment Level 2. Assessment Level 2- Stretch and Point   1. With patient in seated position at the side of the bed, have patient place both feet on the floor (or stool) with knees no higher than hips. 2. Ask patient to stretch one leg and straighten the knee, then bend the ankle/flex and point the toes. If appropriate, repeat with the other leg. Pass- Patient is able to demonstrate appropriate quad strength on intended weight bearing limb(s). Move onto Assessment Level 3. Assessment Level 3- Stand   1. Ask patient to elevate off the bed or chair (seated to standing) using an assistive device (cane, bedrail). 2. Patient should be able to raise buttocks off be and hold for a count of five. May repeat once. Pass- Patient maintains standing stability for at least 5 seconds, proceed to assessment level 4. Assessment Level 4- Walk   1. Ask patient to march in place at bedside. 2. Then ask patient to advance step and return each foot. Some medical conditions may render a patient from stepping backwards, use your best clinical judgement. Pass- Patient demonstrates balance while shifting weight and ability to step, takes independent steps, does not use assistive device patient is MOBILITY LEVEL 4.       Mobility Level- 4

## 2022-06-17 NOTE — PLAN OF CARE
Problem: Discharge Planning  Goal: Discharge to home or other facility with appropriate resources  6/17/2022 0902 by Florencio Lagunas RN  Outcome: Progressing  6/17/2022 0300 by Robles Swartz RN  Outcome: Progressing  Flowsheets  Taken 6/17/2022 0248  Discharge to home or other facility with appropriate resources: Identify barriers to discharge with patient and caregiver  Taken 6/17/2022 0246  Discharge to home or other facility with appropriate resources: Identify barriers to discharge with patient and caregiver     Problem: Pain  Goal: Verbalizes/displays adequate comfort level or baseline comfort level  6/17/2022 0902 by Florencio Lagunas RN  Outcome: Progressing  6/17/2022 0300 by Robles Swartz RN  Outcome: Progressing     Problem: Skin/Tissue Integrity  Goal: Absence of new skin breakdown  Description: 1. Monitor for areas of redness and/or skin breakdown  2. Assess vascular access sites hourly  3. Every 4-6 hours minimum:  Change oxygen saturation probe site  4. Every 4-6 hours:  If on nasal continuous positive airway pressure, respiratory therapy assess nares and determine need for appliance change or resting period.   6/17/2022 0902 by Florencio Lagunas RN  Outcome: Progressing  6/17/2022 0300 by Robles Swartz RN  Outcome: Progressing     Problem: Safety - Adult  Goal: Free from fall injury  6/17/2022 0902 by Florencio Lagunas RN  Outcome: Progressing  6/17/2022 0300 by Robles Swartz RN  Outcome: Progressing

## 2022-06-17 NOTE — PROGRESS NOTES
Spoke with medical records at [facility] in [place], they requested medical request form on letterhead to be faxed to obtain previous records.  This was faxed to [phone]

## 2022-06-17 NOTE — PROGRESS NOTES
Bedside report given and pt care transferred to [facility]. Pt denies needs at this time. Call light within reach.

## 2022-06-17 NOTE — PLAN OF CARE
Patient is 73-year-old male admitted for abdominal pain from [place] for suspected SBP in the ED. CT abdomen obtained showing no ascites or other acute findings, no lactic acidosis, lipase 90.   GI consulted

## 2022-06-17 NOTE — FLOWSHEET NOTE
Patient admitted to room 210 from [facility] ER. Oriented to room and call light. Bed wheels locked. Call light within reach and use of call light explained to patient. No other needs identified at this time. Will continue to monitor. 4 Eyes Skin Assessment     The patient is being assess for   Admission    I agree that 2 RN's have performed a thorough Head to Toe Skin Assessment on the patient. ALL assessment sites listed below have been assessed. Areas assessed for pressure by both nurses:   [x]   Head, Face, and Ears   [x]   Shoulders, Back, and Chest, Abdomen  [x]   Arms, Elbows, and Hands   [x]   Coccyx, Sacrum, and Ischium  [x]   Legs, Feet, and Heels         Scattered bruising    Skin Assessed Under all Medical Devices by both nurses:  N/A              All Mepilex Borders were peeled back and area peeked at by both nurses:  No: None  Please list where Mepilex Borders are located:  N/A             **SHARE this note so that the co-signing nurse is able to place an eSignature**    Co-signer eSignature: Electronically signed by Tamy Nieves RN on 6/17/22 at 5:35 AM EDT    Does the Patient have Skin Breakdown related to pressure? No          Oskar Prevention initiated:  NA   Wound Care Orders initiated:  NA      [facility] nurse consulted for Pressure Injury (Stage 3,4, Unstageable, DTI, NWPT, Complex wounds)and New or Established Ostomies:  NA      Primary Nurse eSignature: Electronically signed by Dawn Steele RN on 6/17/22 at 2:55 AM EDT          Bedside Mobility Assessment Tool (BMAT):     Assessment Level 1- Sit and Shake    1. From a semi-reclined position, ask patient to sit up and rotate to a seated position at the side of the bed. Can use the bedrail. 2. Ask patient to reach out and grab your hand and shake making sure patient reaches across his/her midline. Pass- Patient is able to come to a seated position, maintain core strength. Maintains seated balance while reaching across midline. Move on to Assessment Level 2. Assessment Level 2- Stretch and Point   1. With patient in seated position at the side of the bed, have patient place both feet on the floor (or stool) with knees no higher than hips. 2. Ask patient to stretch one leg and straighten the knee, then bend the ankle/flex and point the toes. If appropriate, repeat with the other leg. Pass- Patient is able to demonstrate appropriate quad strength on intended weight bearing limb(s). Move onto Assessment Level 3. Assessment Level 3- Stand   1. Ask patient to elevate off the bed or chair (seated to standing) using an assistive device (cane, bedrail). 2. Patient should be able to raise buttocks off be and hold for a count of five. May repeat once. Pass- Patient maintains standing stability for at least 5 seconds, proceed to assessment level 4. Assessment Level 4- Walk   1. Ask patient to march in place at bedside. 2. Then ask patient to advance step and return each foot. Some medical conditions may render a patient from stepping backwards, use your best clinical judgement. Pass- Patient demonstrates balance while shifting weight and ability to step, takes independent steps, does not use assistive device patient is MOBILITY LEVEL 4. Mobility Level- 4      Patient is able to demonstrate the ability to move from a reclining position to an upright position within the recliner.

## 2022-06-17 NOTE — H&P
Hospital Medicine History & Physical      PCP: No primary care provider on file. Date of Admission: 6/16/2022    Date of Service: Pt seen/examined on 6/17/2022     Chief Complaint:    Chief Complaint   Patient presents with    Abdominal Pain     Patient states that he has had abdominal pain x 3 weeks. He had a paracentesis 1 week ago in [facility]. Pain and nausea have increaced over the past few days. History Of Present Illness: The patient is a 56 y.o. 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 Recently hospitalized in [facility] for ascites and had multiple liters drained. He states this was a week ago. He states abdominal pain started 6-7 days ago. Some fluid has built back up. He states the pain is diffuse and has continued to worsen. Associated symptoms are nausea. Denies fevers, chills, vomiting, diarrhea, cough, chest pain. He feels short of breath. Vitals stable. Labs with elevated AST, Lipase 90. CXR negative. CT abdomen/pelvis with lingular infiltrate consistent with possible pneumonia, fatty infiltration, splenomegaly, extensive diverticulosis worse in the sigmoid, no acute diverticulitis or evidence of bowel obstruction. Admitted to med-surg. GI consulted. Past Medical History:        Diagnosis Date    Cirrhosis ([facility] [address]     Hepatitis C        Past Surgical History:        Procedure Laterality Date    APPENDECTOMY      CHOLECYSTECTOMY      LEG AMPUTATION BELOW KNEE Right     TONSILLECTOMY         Medications Prior to Admission:    Prior to Admission medications    Medication Sig Start Date End Date Taking?  Authorizing Provider   omeprazole (PRILOSEC) 20 MG delayed release capsule Take 20 mg by mouth daily   Yes Historical Provider, MD   tenofovir disoproxil fumarate (VIREAD) 300 MG tablet Take 300 mg by mouth daily   Yes Historical Provider, MD   entecavir (BARACLUDE) 0.5 MG tablet Take 1 mg by mouth daily   Yes Historical Provider, MD   furosemide (LASIX) 20 MG tablet Take 20 mg by mouth 2 times daily   Yes Historical Provider, MD   pantoprazole (PROTONIX) 40 MG tablet Take 40 mg by mouth daily   Yes Historical Provider, MD   folic acid (FOLVITE) 1 MG tablet Take 1 mg by mouth daily   Yes Historical Provider, MD   spironolactone (ALDACTONE) 50 MG tablet Take 50 mg by mouth daily   Yes Historical Provider, MD   Thiamine HCl (B-1) 100 MG TABS Take by mouth   Yes Historical Provider, MD   polyethylene glycol (MIRALAX) 17 g PACK packet Take 17 g by mouth daily   Yes Historical Provider, MD   ibuprofen (ADVIL;MOTRIN) 400 MG tablet Take 400 mg by mouth every 6 hours as needed for Pain   Yes Historical Provider, MD   Alum & Mag Hydroxide-Simeth (MINTOX EXTRA STRENGTH PO) Take by mouth   Yes Historical Provider, MD   hydrOXYzine pamoate (VISTARIL) 50 MG capsule Take 50 mg by mouth 3 times daily as needed for Itching   Yes Historical Provider, MD   magnesium hydroxide (MILK OF MAGNESIA) 400 MG/5ML suspension Take by mouth daily as needed for Constipation   Yes Historical Provider, MD   ondansetron (ZOFRAN) 4 MG tablet Take 4 mg by mouth every 8 hours as needed for Nausea or Vomiting   Yes Historical Provider, MD   docusate sodium (COLACE) 100 MG capsule Take 100 mg by mouth 2 times daily   Yes Historical Provider, MD       Allergies:  Patient has no known allergies. Social History:    TOBACCO:   reports that he has been smoking. He has been smoking about 1.00 pack per day. He has never used smokeless tobacco.  ETOH:   reports previous alcohol use. Family History:   Positive as follows:    History reviewed. No pertinent family history.     REVIEW OF SYSTEMS:       Constitutional: Negative for fever    Respiratory: + dyspnea, cough   Cardiovascular: Negative for chest pain   Gastrointestinal: Negative for vomiting, diarrhea +abdominal pain, nausea  Genitourinary: Negative for hematuria   Musculoskeletal: Negative for arthralgias   Skin: Negative for rash   Neurological: Negative for syncope   Psychiatric/Behavorial: Negative for anxiety    PHYSICAL EXAM:    /74   Pulse 87   Temp 96.9 °F (36.1 °C) (Oral)   Resp 16   Ht 5' 10\" (1.778 m)   Wt 200 lb (90.7 kg)   SpO2 98%   BMI 28.70 kg/m²     Gen: No distress. Alert. Eyes: PERRL. No sclera icterus. No conjunctival injection. ENT: No discharge. Pharynx clear. Neck:Trachea midline. Resp: No accessory muscle use. No crackles. No wheezes. No rhonchi. CV: Regular rate. Regular rhythm. No murmur. No rub. No edema. GI: + tender all quadrants.+ distended/ascites. Normal bowel sounds. No hernia. Skin: Warm and dry. No nodule on exposed extremities. No rash on exposed extremities. M/S: No cyanosis. No joint deformity. No clubbing. Neuro: Awake. Grossly nonfocal    Psych: Oriented x 3. No anxiety or agitation. Joe Landa MD have reviewed the chart on [employer] and personally interviewed and examined patient, reviewed the data (labs and imaging) and after discussion with my PA formulated the plan. Agree with note with the following edits. HPI: A 62-year-old with a history of cirrhosis secondary to hepatitis C, history of alcohol abuse presented emergency room with abdominal pain. I reviewed the patient's Past Medical History, Past Surgical History, Medications, and Allergies. Physical exam:    /76   Pulse 90   Temp 97 °F (36.1 °C) (Oral)   Resp 18   Ht 5' 10\" (1.778 m)   Wt 200 lb (90.7 kg)   SpO2 93%   BMI 28.70 kg/m²     Gen: No distress. Alert. Eyes: PERRL. No sclera icterus. No conjunctival injection. ENT: No discharge. Pharynx clear. Neck: Trachea midline. Normal thyroid. Resp: No accessory muscle use. No crackles. No wheezes. No rhonchi. No dullness on percussion. CV: Regular rate. Regular rhythm. No murmur or rub. No edema. GI: Non-tender. Non-distended. No masses. No organomegaly. Normal bowel sounds. No hernia. Skin: Warm and dry. No nodule on exposed extremities. No rash on exposed extremities. Lymph: No cervical LAD. No supraclavicular LAD. M/S: No cyanosis. No joint deformity. No clubbing. Neuro: Awake. . Moves all 4 extremities, non focal  Psych: Oriented x 3. No anxiety or agitation. VIDAL Livingston         CBC:   Recent Labs     06/16/22 1715 06/17/22  0326   WBC 6.2 5.6   HGB 13.7 13.2*   HCT 39.9* 39.2*   .0* 105.2*    222     BMP:   Recent Labs     06/16/22 1715 06/17/22  0326    137   K 5.1 3.5    102   CO2 25 23   BUN 13 10   CREATININE 1.3 1.2     LIVER PROFILE:   Recent Labs     06/16/22 1715 06/17/22  0326   * 114*   ALT 27 25   LIPASE 90.0*  --    BILITOT 1.3* 1.4*   ALKPHOS 123 121     PT/INR:   Recent Labs     06/16/22 1715   PROTIME 15.4*   INR 1.23*     UA:  Recent Labs     06/16/22  1644   COLORU Yellow   PHUR 7.5   CLARITYU Clear   SPECGRAV <=1.005   LEUKOCYTESUR Negative   UROBILINOGEN 1.0   BILIRUBINUR Negative   BLOODU Negative   GLUCOSEU Negative       CARDIAC ENZYMES  Recent Labs     06/17/22 0326   TROPONINI <0.01       CULTURES  COVID: not detected  Ascitic fluid: pending     EKG:  I have reviewed the EKG with the following interpretation:   Normal sinus rhythm, Left axis deviation, Nonspecific ST abnormality    RADIOLOGY  CT ABDOMEN PELVIS W IV CONTRAST Additional Contrast? None   Final Result   1. Lingular infiltrate consistent with possible pneumonia. 2. Fatty infiltration but no focal disease. Splenomegaly. 3. Extensive diverticulosis worse in the sigmoid but no acute diverticulitis. No evidence of bowel obstruction. XR CHEST PORTABLE   Final Result   No acute cardiopulmonary disease. Active Problems:    * No active hospital problems. *  Resolved Problems:    * No resolved hospital problems. *        ASSESSMENT/PLAN:  Abdominal pain  Possibly SBP  -admitted to med-surg.   GI consulted. -Rocephin D#1  -Oxy-IR prn  Bedside ultrasound-guided paracentesis attempted in the ER. Not successful. Also CT of the abdomen pelvis does not mention presence of ascites currently. Hepatitis C  Cirrhosis  Elevated LFT's  -GI consult  -monitor labs  -resumed home medications. Lingular infiltrate  Possible pneumonia  Patient denies any cough however admits to having shortness of breath. Chronic smoker  Continue Rocephin. add azithromycin    H/o alcohol abuse  - no longer drinks alcohol   -continue Thiamine, Folic acid    GERD  -on PPI    Tobacco Dependence  -Recommended cessation    DVT Prophylaxis: Lovenox  Diet: ADULT DIET; Regular; 3 carb choices (45 gm/meal); Low Fat/Low Chol/High Fiber/2 gm Na; Low Sodium (2 gm)  Code Status: Full Code    Bridger LARIOSP-C  6/17/2022      Agree with above    RONEY Livingston.

## 2022-06-17 NOTE — CONSULTS
Consultation Note    Patient Name: Aileen Barros  : 1965  Age: 49 y.o. Admitting Physician: Dino Torres  Date of Admission: 2022  4:41 PM   Primary Care Physician: No primary care provider on file. Aileen Barros is being seen at the request of Dino Torres for abdominal pain. History of Present Illness:  50 yo M w/ PMHx of Hep C Cirrhosis decompensated with Ascites. Other history reported to have HBV on Tenofovir. Abdominal Surgeries include Appendectomy, Cholecystectomy. Patient presented to ED with progressive abdominal pain. H/O Recent Admission at [facility] in [place], [place] for decompansated Cirrhosis s/p Paracentesis with Large Volume fluid drainage. Patient endorses ongoing abdominal pain for past 10+ months. Initially per his report he was w/u for Gallbladder disease and about 2 months ago had lap Cholecystectomy for gallstones. Pain improved for short time but over past few weeks has gotten progressively worse. Associated nausea/vomiting reported as chronic, abdominal bloating which recent started. Denies any fever/chills, weight loss, hematemesis, hematochezia melena. Endorses chronic constipation with sensation of incomplete evacuation. He endorses several hard small BM's throughout the day with occasional Bright red blood with wiping. Reports he was diagnosed with HCV and Cirrhosis approximately 10 months ago when evaluated for abdominal pain. Unsure about Rx for HCV per patient recall, states \"I am on like 10 medications\". Recent hospitalization in [place], [place] (where patient resides) for abdominal distention/decompensated Cirrhosis. Reports 2 L removed in a Para, no current available records found. He was admitted to [address] [place] [address] in [place]. On admission, CT AP w/ IV Contrast revealing   1. Lingular infiltrate consistent with possible pneumonia. 2. Fatty infiltration but no focal disease.  Splenomegaly.    3. Extensive diverticulosis worse in the sigmoid but no acute diverticulitis. No evidence of bowel obstruction. Sodium 138, Potassium 5.1 rechecked 3.5, BUN 13, Creatinine 1.3, Lactic Acid 1.3, , ALT 27, , T. Bili 1.3, WBC 6.2, Hgb 13.7, .0; INR 1.23; Ammonia 80. MELD Na from 6/16/2022: 12    GI History:  · Denies prior EGD   · Colonoscopy reported by patient, approximately 15 years ago with history of Polyps. · Denies any FHx of GI malignancies  · Prior Heavy ETOH abuse, averaging 1 Pint Perkins Daily, last ETOH noted Late April/Early May per patient recall. · Smoke 1 PPD Cigarettes. Past Medical History:  Past Medical History:   Diagnosis Date    Cirrhosis ([facility] [address]     Hepatitis C         Past Surgical History:  Past Surgical History:   Procedure Laterality Date    APPENDECTOMY      CHOLECYSTECTOMY      LEG AMPUTATION BELOW KNEE Right     TONSILLECTOMY          Historical Medications:  Prior to Visit Medications    Medication Sig Taking?  Authorizing Provider   omeprazole (PRILOSEC) 20 MG delayed release capsule Take 20 mg by mouth daily Yes Historical Provider, MD   tenofovir disoproxil fumarate (VIREAD) 300 MG tablet Take 300 mg by mouth daily Yes Historical Provider, MD   entecavir (BARACLUDE) 0.5 MG tablet Take 1 mg by mouth daily Yes Historical Provider, MD   furosemide (LASIX) 20 MG tablet Take 20 mg by mouth 2 times daily Yes Historical Provider, MD   pantoprazole (PROTONIX) 40 MG tablet Take 40 mg by mouth daily Yes Historical Provider, MD   folic acid (FOLVITE) 1 MG tablet Take 1 mg by mouth daily Yes Historical Provider, MD   spironolactone (ALDACTONE) 50 MG tablet Take 50 mg by mouth daily Yes Historical Provider, MD   Thiamine HCl (B-1) 100 MG TABS Take by mouth Yes Historical Provider, MD   polyethylene glycol (MIRALAX) 17 g PACK packet Take 17 g by mouth daily Yes Historical Provider, MD   ibuprofen (ADVIL;MOTRIN) 400 MG tablet Take 400 mg by mouth every 6 hours as needed for Pain Yes Historical Provider, MD   Alum & Mag Hydroxide-Simeth (MINTOX EXTRA STRENGTH PO) Take by mouth Yes Historical Provider, MD   hydrOXYzine pamoate (VISTARIL) 50 MG capsule Take 50 mg by mouth 3 times daily as needed for Itching Yes Historical Provider, MD   magnesium hydroxide (MILK OF MAGNESIA) 400 MG/5ML suspension Take by mouth daily as needed for Constipation Yes Historical Provider, MD   ondansetron (ZOFRAN) 4 MG tablet Take 4 mg by mouth every 8 hours as needed for Nausea or Vomiting Yes Historical Provider, MD   docusate sodium (COLACE) 100 MG capsule Take 100 mg by mouth 2 times daily Yes Historical Provider, MD        Hospital Medications:  Current Facility-Administered Medications: sodium chloride flush 0.9 % injection 10 mL, 10 mL, IntraVENous, 2 times per day  sodium chloride flush 0.9 % injection 10 mL, 10 mL, IntraVENous, PRN  0.9 % sodium chloride infusion, , IntraVENous, PRN  potassium chloride 10 mEq/100 mL IVPB (Peripheral Line), 10 mEq, IntraVENous, PRN  magnesium sulfate 2000 mg in 50 mL IVPB premix, 2,000 mg, IntraVENous, PRN  promethazine (PHENERGAN) tablet 12.5 mg, 12.5 mg, Oral, Q6H PRN **OR** ondansetron (ZOFRAN) injection 4 mg, 4 mg, IntraVENous, Q6H PRN  acetaminophen (TYLENOL) tablet 650 mg, 650 mg, Oral, Q6H PRN **OR** acetaminophen (TYLENOL) suppository 650 mg, 650 mg, Rectal, Q6H PRN  oxyCODONE (ROXICODONE) immediate release tablet 10 mg, 10 mg, Oral, Q4H PRN  oxyCODONE (ROXICODONE) immediate release tablet 5 mg, 5 mg, Oral, Q4H PRN     Social History:   Social History     Tobacco History     Smoking Status  Current Every Day Smoker Smoking Frequency  1 pack/day    Smokeless Tobacco Use  Never Used          Alcohol History     Alcohol Use Status  Not Currently          Drug Use     Drug Use Status  Yes Types  Marijuana (Weed)          Sexual Activity     Sexually Active  Not Asked                 Family History:  History reviewed. No pertinent family history.      Allergies:  No Known Allergies     ROS:   General: No fever or weight change  Hematologic: No unexpected submucosal bleeding or bruising  HEENT: No sore throat or facial pain  Respiratory: No cough or dyspnea  Cardiovascular: No angina or dependent edema  Gastrointestinal: See HPI  Musculoskeletal: No usual joint pain or stiffness  Skin: No skin eruptions or changing lesions  Neurologic: No focal weakness or numbness  Psychiatric: No anxiety or sleep disturbance    Physical Exam:  Vital Signs:   Vitals:    06/17/22 0845   BP: 115/76   Pulse: 90   Resp: 18   Temp: 97 °F (36.1 °C)   SpO2: 93%       General: Chronically ill appearance. HEENT: Sclera anicteric, mucosal membranes moist  Cardiovascular: Regular rate and rhythm. No murmurs. Respiratory: Respirations nonlabored, no crepitus  GI: Abdomen distended, diffuse mild tenderness with palpation. Hypoactive bowel sounds. No masses palpable. Rectal: Deferred  Musculoskeletal: trace pitting edema of the Left lower leg, Right BKA. Neurological: Gross memory appears intact. Patient is alert and oriented    Recent labs and imaging reviewed. Assessment:    48 yo M w/ PMHx of Hep C, presumed ETOH  Cirrhosis decompensated with ascites, chronic HBV on Tenofovir and abdominal surgeries of appendectomy, and cholecystectomy admitted with progressive abdominal pain and constipaton. CT evidence of pneumonia, extensive diverticulosis without diverticulitis; fatty liver and splenomegaly; no ascites. 1. Alcohol, HCV and HBV Cirrhosis with history of ascites; - currently compensated. MELD Na = 12   Negative ascites on CT  Diet 100 g protein, low sodium < 2 gm/day  Folic acid, thiamine, multivitamin  Continue diuretics with Lasix 40 mg p.o. daily and Spironolactone 50 mg p.o. daily  Monitor CBC, BMP, LFTs, PT/INR daily  Check HCV RNA, HBV DNA levels   To discuss anti-virals for HBV with Hospital team as well, currently prescribed for entecavir and Tenofovir.   Counseled on alcohol cessation. Elective EGD to screen for varices  Obtain records from recent hospitalization at [facility] in [place], discussed with RN--waiting on records    2. Constipation   Start lactulose 3 times daily titrate to 2-3 soft bowel movements daily    GI to follow    [facility]    [phone].  Also available via [employer]

## 2022-06-17 NOTE — PROCEDURES
After consent was obtained, using sterile technique the LRQ abdomen was prepped and plain Lidocaine 1% was used as local anesthetic. Ultrasound was used to attempt to find a fluid pocket; which none could easily be identified. The abdomen was entered and 0 ml's of  fluid was withdrawn. The procedure was well tolerated. Watch for fever, or increased swelling or persistent pain in the joint.

## 2022-06-17 NOTE — CARE COORDINATION
Case Management Assessment  Initial Evaluation      Patient Name: Hira Goff  YOB: 1965  Diagnosis: Generalized abdominal pain [R10.84]  SBP (spontaneous bacterial peritonitis) ([facility] [address] [K65.2]  Abdominal pain [R10.9]  Date / Time: 6/16/2022  4:41 PM    Admission status/Date: Observation 6/16/2022  Chart Reviewed: Yes      Patient Interviewed: Yes   Family Interviewed:  No      Hospitalization in the last 30 days:  No      Health Care Decision Maker :   Primary Decision Maker: Otis Antoni - Parent - [phone]      Who do you trust or have selected to make healthcare decisions for you    Met with: Patient at bedside. Current PCP: Dr. Gillian Peter required for SNF : Y, N          3 night stay required - Amy JONES    ADLS  Support Systems/Care Needs: Friends/Neighbors  Transportation: self    Meal Preparation: self    Housing  Living Arrangements:Lives in home alone  Steps: 0  Intent for return to present living arrangements: Yes - after finishing stay at [facility]  Identified Issues: None at this time.     Home Care Information  Active with Home Health Care : No Agency:(Services)  Type of Home Care Services: None  Passport/Waiver : No  :                      Phone Number:    Passport/Waiver Services: n/a           Durable Medical Equiptment   DME Provider: n/a   Equipment:   Walker___Cane___RTS___ BSC___Shower Chair___Hospital Bed___W/C____Other________  02 at ____Liter(s)---wears(frequency)_______ HHN ___ CPAP___ BiPap___   N/A__x__      Home O2 Use :  No    If No for home O2---if presently on O2 during hospitalization:  No  if yes CM to follow for potential DC O2 need  Informed of need for care provider to bring portable home O2 tank on day of discharge for nursing to connect prior to leaving:   Not Indicated  Verbalized agreement/Understanding:   Not Indicated    Community Service Affiliation  Dialysis:  No    · Agency:  · Location:  · Dialysis Schedule:  · Phone:   · Fax: Other Community Services: n/a   DISCHARGE PLAN: Explained Case Management role/services. CM reviewed chart and met with patient at bedside to discuss discharge needs and plan. Patient lives in home alone in [place], [place]. States IPTA and active . States he was brought to hospital from [facility] and will return there at discharge. CM inquired on insurance and PCP as none listed on demographic sheet. Per patient, his PCP is Dr. Caridad Garner and has MCR A&B. CM contacted Marybeth Cleaning in registration and updated. CM contacted Jearline Fleischer at [facility] who confirmed patient was brought to hospital from their program. Is to return to program at discharge. States they must be notified of patient's discharge at [phone] and will pick patient up.      Satnam Henley RN

## 2022-06-17 NOTE — ED NOTES
Cup of ice chips given per pt request and verbal permission from Dr. Alla Isaacs.      Leobardo Clifton, RN  06/16/22 5683

## 2022-06-18 VITALS
DIASTOLIC BLOOD PRESSURE: 81 MMHG | WEIGHT: 199.4 LBS | SYSTOLIC BLOOD PRESSURE: 123 MMHG | OXYGEN SATURATION: 92 % | BODY MASS INDEX: 28.55 KG/M2 | TEMPERATURE: 97.8 F | HEART RATE: 96 BPM | RESPIRATION RATE: 22 BRPM | HEIGHT: 70 IN

## 2022-06-18 LAB
A/G RATIO: 0.7 (ref 1.1–2.2)
ALBUMIN SERPL-MCNC: 3.2 G/DL (ref 3.4–5)
ALP BLD-CCNC: 128 U/L (ref 40–129)
ALT SERPL-CCNC: 27 U/L (ref 10–40)
ANION GAP SERPL CALCULATED.3IONS-SCNC: 15 MMOL/L (ref 3–16)
AST SERPL-CCNC: 120 U/L (ref 15–37)
BASOPHILS ABSOLUTE: 0.2 K/UL (ref 0–0.2)
BASOPHILS RELATIVE PERCENT: 3 %
BILIRUB SERPL-MCNC: 1.5 MG/DL (ref 0–1)
BUN BLDV-MCNC: 10 MG/DL (ref 7–20)
CALCIUM SERPL-MCNC: 9.4 MG/DL (ref 8.3–10.6)
CHLORIDE BLD-SCNC: 101 MMOL/L (ref 99–110)
CO2: 23 MMOL/L (ref 21–32)
CREAT SERPL-MCNC: 1.2 MG/DL (ref 0.9–1.3)
EOSINOPHILS ABSOLUTE: 0.1 K/UL (ref 0–0.6)
EOSINOPHILS RELATIVE PERCENT: 1.4 %
GFR AFRICAN AMERICAN: >60
GFR NON-AFRICAN AMERICAN: >60
GLUCOSE BLD-MCNC: 109 MG/DL (ref 70–99)
HBV SURFACE AB TITR SER: 23.41 MIU/ML
HCT VFR BLD CALC: 39.8 % (ref 40.5–52.5)
HEMOGLOBIN: 13.2 G/DL (ref 13.5–17.5)
HEPATITIS B SURFACE ANTIGEN INTERPRETATION: REACTIVE
LYMPHOCYTES ABSOLUTE: 1.4 K/UL (ref 1–5.1)
LYMPHOCYTES RELATIVE PERCENT: 25.2 %
MCH RBC QN AUTO: 34.9 PG (ref 26–34)
MCHC RBC AUTO-ENTMCNC: 33.2 G/DL (ref 31–36)
MCV RBC AUTO: 105.1 FL (ref 80–100)
MONOCYTES ABSOLUTE: 0.7 K/UL (ref 0–1.3)
MONOCYTES RELATIVE PERCENT: 11.8 %
NEUTROPHILS ABSOLUTE: 3.3 K/UL (ref 1.7–7.7)
NEUTROPHILS RELATIVE PERCENT: 58.6 %
PDW BLD-RTO: 16.5 % (ref 12.4–15.4)
PLATELET # BLD: 213 K/UL (ref 135–450)
PMV BLD AUTO: 8.7 FL (ref 5–10.5)
POTASSIUM REFLEX MAGNESIUM: 3.8 MMOL/L (ref 3.5–5.1)
RBC # BLD: 3.79 M/UL (ref 4.2–5.9)
SODIUM BLD-SCNC: 139 MMOL/L (ref 136–145)
TOTAL PROTEIN: 7.8 G/DL (ref 6.4–8.2)
WBC # BLD: 5.7 K/UL (ref 4–11)

## 2022-06-18 PROCEDURE — 6370000000 HC RX 637 (ALT 250 FOR IP): Performed by: NURSE PRACTITIONER

## 2022-06-18 PROCEDURE — 6360000002 HC RX W HCPCS: Performed by: NURSE PRACTITIONER

## 2022-06-18 PROCEDURE — 6360000002 HC RX W HCPCS: Performed by: INTERNAL MEDICINE

## 2022-06-18 PROCEDURE — 96372 THER/PROPH/DIAG INJ SC/IM: CPT

## 2022-06-18 PROCEDURE — 2580000003 HC RX 258: Performed by: INTERNAL MEDICINE

## 2022-06-18 PROCEDURE — G0378 HOSPITAL OBSERVATION PER HR: HCPCS

## 2022-06-18 PROCEDURE — 80053 COMPREHEN METABOLIC PANEL: CPT

## 2022-06-18 PROCEDURE — 6370000000 HC RX 637 (ALT 250 FOR IP): Performed by: INTERNAL MEDICINE

## 2022-06-18 PROCEDURE — 99217 PR OBSERVATION CARE DISCHARGE MANAGEMENT: CPT | Performed by: INTERNAL MEDICINE

## 2022-06-18 PROCEDURE — 94761 N-INVAS EAR/PLS OXIMETRY MLT: CPT

## 2022-06-18 PROCEDURE — 36415 COLL VENOUS BLD VENIPUNCTURE: CPT

## 2022-06-18 PROCEDURE — 85025 COMPLETE CBC W/AUTO DIFF WBC: CPT

## 2022-06-18 PROCEDURE — 2700000000 HC OXYGEN THERAPY PER DAY

## 2022-06-18 PROCEDURE — 96376 TX/PRO/DX INJ SAME DRUG ADON: CPT

## 2022-06-18 RX ORDER — FUROSEMIDE 40 MG/1
40 TABLET ORAL DAILY
Qty: 30 TABLET | Refills: 0 | Status: SHIPPED | OUTPATIENT
Start: 2022-06-18

## 2022-06-18 RX ORDER — LACTULOSE 10 G/15ML
20 SOLUTION ORAL 2 TIMES DAILY
Qty: 900 ML | Refills: 0 | Status: SHIPPED | OUTPATIENT
Start: 2022-06-18 | End: 2022-06-20

## 2022-06-18 RX ORDER — ALBUTEROL SULFATE 90 UG/1
2 AEROSOL, METERED RESPIRATORY (INHALATION) EVERY 6 HOURS PRN
Qty: 18 G | Refills: 0 | Status: SHIPPED | OUTPATIENT
Start: 2022-06-18 | End: 2022-06-20

## 2022-06-18 RX ORDER — LEVOFLOXACIN 500 MG/1
500 TABLET, FILM COATED ORAL DAILY
Qty: 5 TABLET | Refills: 0 | Status: SHIPPED | OUTPATIENT
Start: 2022-06-18 | End: 2022-06-23

## 2022-06-18 RX ADMIN — Medication 10 ML: at 08:30

## 2022-06-18 RX ADMIN — FUROSEMIDE 20 MG: 20 TABLET ORAL at 08:29

## 2022-06-18 RX ADMIN — OXYCODONE 5 MG: 5 TABLET ORAL at 08:28

## 2022-06-18 RX ADMIN — Medication 100 MG: at 08:28

## 2022-06-18 RX ADMIN — FOLIC ACID 1 MG: 1 TABLET ORAL at 08:28

## 2022-06-18 RX ADMIN — ENOXAPARIN SODIUM 40 MG: 100 INJECTION SUBCUTANEOUS at 08:29

## 2022-06-18 RX ADMIN — ONDANSETRON HYDROCHLORIDE 4 MG: 2 INJECTION, SOLUTION INTRAMUSCULAR; INTRAVENOUS at 08:34

## 2022-06-18 RX ADMIN — PANTOPRAZOLE SODIUM 40 MG: 40 TABLET, DELAYED RELEASE ORAL at 05:33

## 2022-06-18 RX ADMIN — LACTULOSE 20 G: 10 SOLUTION ORAL at 08:37

## 2022-06-18 RX ADMIN — OXYCODONE 5 MG: 5 TABLET ORAL at 04:04

## 2022-06-18 RX ADMIN — ENTECAVIR 1 MG: 0.5 TABLET ORAL at 10:44

## 2022-06-18 RX ADMIN — SPIRONOLACTONE 50 MG: 25 TABLET ORAL at 08:29

## 2022-06-18 ASSESSMENT — PAIN SCALES - GENERAL
PAINLEVEL_OUTOF10: 7

## 2022-06-18 ASSESSMENT — PAIN DESCRIPTION - LOCATION
LOCATION: ABDOMEN
LOCATION: ABDOMEN

## 2022-06-18 ASSESSMENT — PAIN DESCRIPTION - ORIENTATION: ORIENTATION: MID

## 2022-06-18 NOTE — CARE COORDINATION
DISCHARGE ORDER  Date/Time 2022 11:11 AM  Completed by: Rupa Joya RN, Case Management    Patient Name: Warden Hashimoto      : 1965  Admitting Diagnosis: Generalized abdominal pain [R10.84]  SBP (spontaneous bacterial peritonitis) ([facility] [address] [K65.2]  Abdominal pain [R10.9]      Admit order Date and Status:obs  (verify MD's last order for status of admission)      Noted discharge order. If applicable PT/OT recommendation at Discharge: na  DME recommendation by PT/OT:na    Discharge Plan: Reviewed chart. Plan continues return to [facility] at d/c today and pickup time per Cecilia Cleary with [facility] hospital is 1400 today. Per Shaheen Darling at [facility] can accept the pt back today. No other d/c needs voiced or identified. Date of Last IMM Given: 22    Reviewed chart. Role of discharge planner explained and patient verbalized understanding. Discharge order is noted. Has Home O2 in place on admit:  No  Informed of need to bring portable home O2 tank on day of discharge for nursing to connect prior to leaving:   Not Indicated  Verbalized agreement/Understanding:   Not Indicated  Pt is being d/c'd to [facility]  today. Pt's O2 sats are 92% on RA. Discharge timeout done with TIFFANIE Parker. All discharge needs and concerns addressed.

## 2022-06-18 NOTE — PROGRESS NOTES
Alfred Garnica to [facility] to let her know that pt is ready to be discharged. She will figure out transportation for pt.

## 2022-06-18 NOTE — PROGRESS NOTES
Prescriptions and discharge instructions given to [facility].  Walked pt off floor to awaiting vehicle for transport back to [facility].

## 2022-06-18 NOTE — FLOWSHEET NOTE
06/18/22 0815   Vital Signs   Temp 97.8 °F (36.6 °C)   Temp Source Oral   Heart Rate 96   Heart Rate Source Monitor   Resp 22   /81   BP Location Left upper arm   MAP (Calculated) 95   Patient Position Semi fowlers   Level of Consciousness Alert (0)   MEWS Score 2   Pain Assessment   Pain Assessment 0-10   Pain Level 7   Pain Location Abdomen   Oxygen Therapy   SpO2 92 %   O2 Device Nasal cannula   Shift assessment complete - See flow sheet. Medications given - See [facility]     Patient with no complaints of pain at this time. Patient denies any further needs. Bed alarm is Deferred for low/med risk, A/O with steady gait   Call light in reach  Bedside Mobility Assessment Tool (BMAT):     Assessment Level 1- Sit and Shake    1. From a semi-reclined position, ask patient to sit up and rotate to a seated position at the side of the bed. Can use the bedrail. 2. Ask patient to reach out and grab your hand and shake making sure patient reaches across his/her midline. Pass- Patient is able to come to a seated position, maintain core strength. Maintains seated balance while reaching across midline. Move on to Assessment Level 2. Assessment Level 2- Stretch and Point   1. With patient in seated position at the side of the bed, have patient place both feet on the floor (or stool) with knees no higher than hips. 2. Ask patient to stretch one leg and straighten the knee, then bend the ankle/flex and point the toes. If appropriate, repeat with the other leg. Pass- Patient is able to demonstrate appropriate quad strength on intended weight bearing limb(s). Move onto Assessment Level 3. Assessment Level 3- Stand   1. Ask patient to elevate off the bed or chair (seated to standing) using an assistive device (cane, bedrail). 2. Patient should be able to raise buttocks off be and hold for a count of five. May repeat once.    Pass- Patient maintains standing stability for at least 5 seconds, proceed to assessment level 4. Assessment Level 4- Walk   1. Ask patient to march in place at bedside. 2. Then ask patient to advance step and return each foot. Some medical conditions may render a patient from stepping backwards, use your best clinical judgement. Pass- Patient demonstrates balance while shifting weight and ability to step, takes independent steps, does not use assistive device patient is MOBILITY LEVEL 4.       Mobility Level- 4

## 2022-06-18 NOTE — DISCHARGE INSTR - COC
Continuity of Care Form    Patient Name: Ilia Herrera   :  1965  MRN:  [id number]    Admit date:  2022  Discharge date:  2022    Code Status Order: Full Code   Advance Directives:      Admitting Physician:  Kelly Urbina MD  PCP: No primary care provider on file. Discharging Nurse: [facility] Unit/Room#: 0210/0210-01  Discharging Unit Phone Number: [phone]    Emergency Contact:   Extended Emergency Contact Information  Primary Emergency Contact: Perico Montoya  Mobile Phone: [phone]  Relation: Parent   needed? No    Past Surgical History:  Past Surgical History:   Procedure Laterality Date    APPENDECTOMY      CHOLECYSTECTOMY      LEG AMPUTATION BELOW KNEE Right     TONSILLECTOMY         Immunization History: There is no immunization history on file for this patient. Active Problems:  Patient Active Problem List   Diagnosis Code    Generalized abdominal pain R10.84    Other cirrhosis of liver (HCC) K74.69    Alcohol abuse F10.10    Pneumonia due to infectious organism J18.9       Isolation/Infection:   Isolation            No Isolation          Patient Infection Status       None to display            Nurse Assessment:  Last Vital Signs: /81   Pulse 96   Temp 97.8 °F (36.6 °C) (Oral)   Resp 22   Ht 5' 10\" (1.778 m)   Wt 199 lb 6.4 oz (90.4 kg)   SpO2 92%   BMI 28.61 kg/m²     Last documented pain score (0-10 scale): Pain Level: 7  Last Weight:   Wt Readings from Last 1 Encounters:   22 199 lb 6.4 oz (90.4 kg)     Mental Status:  oriented and alert    IV Access:  - None    Nursing Mobility/ADLs:  Walking   Independent  Transfer  Independent  Bathing  Independent  Dressing  Independent  [address] Delivery   whole    Wound Care Documentation and Therapy:        Elimination:  Continence:    Bowel: Yes  Bladder: Yes  Urinary Catheter: None Colostomy/Ileostomy/Ileal Conduit: No       Date of Last BM: 6/18/2022      Intake/Output Summary (Last 24 hours) at 6/18/2022 1221  Last data filed at 6/18/2022 0817  Gross per 24 hour   Intake 560 ml   Output --   Net 560 ml     I/O last 3 completed shifts: In: 5461 [P.O.:240; IV Piggyback:1050]  Out: -     Safety Concerns:     None    Impairments/Disabilities:      None    Nutrition Therapy:  Current Nutrition Therapy:   - Oral Diet:  Carb Control 3 carbs/meal (1500kcals/day)    Routes of Feeding: Oral  Liquids: No Restrictions  Daily Fluid Restriction: no  Last Modified Barium Swallow with Video (Video Swallowing Test): not done    Treatments at the Time of Hospital Discharge:   Respiratory Treatments: ***  Oxygen Therapy:  is not on home oxygen therapy.   Ventilator:    - No ventilator support    Rehab Therapies: ***  Weight Bearing Status/Restrictions: No weight bearing restrictions  Other Medical Equipment (for information only, NOT a DME order):  ***  Other Treatments: ***    Patient's personal belongings (please select all that are sent with patient):  None    RN SIGNATURE:  Electronically signed by Stacie Lamb RN on 6/18/22 at 12:25 PM EDT    CASE MANAGEMENT/SOCIAL WORK SECTION    Inpatient Status Date: ***    Readmission Risk Assessment Score:  Readmission Risk              Risk of Unplanned Readmission:  14           Discharging to Facility/ Agency   Name:   Address:  Phone:  Fax:    Dialysis Facility (if applicable)   Name:  Address:  Dialysis Schedule:  Phone:  Fax:    / signature: {Esignature:[id number]}    PHYSICIAN SECTION    Prognosis: {Prognosis:4843016514}    Condition at Discharge: [facility] Patient Condition:904928071}    Rehab Potential (if transferring to Rehab): {Prognosis:7228030448}    Recommended Labs or Other Treatments After Discharge: ***    Physician Certification: I certify the above information and transfer of Maddie Mask  is necessary for the continuing treatment of the diagnosis listed and that he requires {Admit to Appropriate Level of Care:04091} for {GREATER/LESS:223950071} 30 days.      Update Admission H&P: {CHP DME Changes in YYQ:509373294}    PHYSICIAN SIGNATURE:  {Esignature:[id number]}

## 2022-06-18 NOTE — PROGRESS NOTES
Progress Note    Patient Mulugeta Guerrero  MRN: [id number]  YOB: 1965 Age: 49 y.o. Sex: male  Room: [address] [place] [address]       Admitting Physician: Kian Haro MD   Date of Admission: 6/16/2022  4:41 PM   Primary Care Physician: No primary care provider on file. Subjective:  Mulugeta Guerrero was seen and examined. We are following for cirrhosis and ascites. -- He reports he is to be discharged today and plans to establish with a hepatologist in [facility]  --Abdomen is smaller than previously    ROS:  Constitutional: Denies fever, no change in appetite  Cardiovascular: Denies chest pain or edema    Objective:  Vital Signs:   Vitals:    06/18/22 0815   BP: 123/81   Pulse: 96   Resp: 22   Temp: 97.8 °F (36.6 °C)   SpO2: 92%         Physical Exam:  Constitutional: Alert and oriented x 4. No acute distress. Respiratory: Respirations nonlabored, no crepitus  GI: Abdomen nondistended, soft, and nontender. Neurological: No focal deficits noted. No asterixis.     Intake/Output:    Intake/Output Summary (Last 24 hours) at 6/18/2022 1147  Last data filed at 6/18/2022 0817  Gross per 24 hour   Intake 800 ml   Output --   Net 800 ml        Current Medications:  Current Facility-Administered Medications   Medication Dose Route Frequency Provider Last Rate Last Admin    sodium chloride flush 0.9 % injection 10 mL  10 mL IntraVENous 2 times per day Collin EASON Stephanie, DO   10 mL at 06/18/22 0830    sodium chloride flush 0.9 % injection 10 mL  10 mL IntraVENous PRN Brantd A Stephanie, DO        0.9 % sodium chloride infusion   IntraVENous PRN Jaret Jaime Stephanie, DO        potassium chloride 10 mEq/100 mL IVPB (Peripheral Line)  10 mEq IntraVENous PRN Jaret Jaime Stephanie, DO        magnesium sulfate 2000 mg in 50 mL IVPB premix  2,000 mg IntraVENous PRN Collin Mobleyjazi, DO        promethazine (PHENERGAN) tablet 12.5 mg  12.5 mg Oral Q6H PRN Brantd A Stephanie, DO        Or    ondansetron (ZOFRAN) injection 4 mg  4 mg IntraVENous Q6H PRN Brian Brown DO   4 mg at 06/18/22 0834    acetaminophen (TYLENOL) tablet 650 mg  650 mg Oral Q6H PRN Jovana Brown DO        Or    acetaminophen (TYLENOL) suppository 650 mg  650 mg Rectal Q6H PRN Damon Brown DO        entecavir (BARACLUDE) tablet 1 mg  1 mg Oral Q24H SARTHAK Abad - CNP   1 mg at 59/34/23 8548    folic acid (FOLVITE) tablet 1 mg  1 mg Oral Daily Rosa Maria Newton APRN - CNP   1 mg at 06/18/22 1528    furosemide (LASIX) tablet 20 mg  20 mg Oral BID SARTHAK Abad - CNP   20 mg at 06/18/22 7121    hydrOXYzine HCl (ATARAX) tablet 50 mg  50 mg Oral TID PRN Rosa Maria Newton APRN - CNP   50 mg at 06/17/22 1052    pantoprazole (PROTONIX) tablet 40 mg  40 mg Oral QAM AC SARTHAK Abad - CNP   40 mg at 06/18/22 0533    polyethylene glycol (GLYCOLAX) packet 17 g  17 g Oral Daily Rosa Maria Newton APRN - CNP   17 g at 06/17/22 1042    spironolactone (ALDACTONE) tablet 50 mg  50 mg Oral Daily SARTHAK Abad - CNP   50 mg at 06/18/22 0829    tenofovir disoproxil fumarate (VIREAD) tablet 300 mg  300 mg Oral Daily SARTHAK Abad CNP        thiamine tablet 100 mg  100 mg Oral Daily SARTHAK Abad - CNP   100 mg at 06/18/22 0828    enoxaparin (LOVENOX) injection 40 mg  40 mg SubCUTAneous Daily SARTHAK Abad - CNP   40 mg at 06/18/22 0829    cefTRIAXone (ROCEPHIN) 1000 mg IVPB in 50 mL D5W minibag  1,000 mg IntraVENous Q24H SARTHAK Abad - CNP   Stopped at 06/17/22 2209    azithromycin (ZITHROMAX) 500 mg in D5W 250ml addavial  500 mg IntraVENous Q24H Sean Izaguirre MD   Stopped at 06/17/22 1436    albuterol sulfate HFA (PROVENTIL;VENTOLIN;PROAIR) 108 (90 Base) MCG/ACT inhaler 2 puff  2 puff Inhalation Q6H PRN Sean Izaguirre MD        lactulose (CHRONULAC) 10 GM/15ML solution 20 g  20 g Oral TID SARTHAK Hernandez - CNP   20 g at 06/18/22 6290         Recent labs and imaging reviewed.       Assessment:    HBV cirrhosis - on Tenofovir  Ascites - s/p large volume para prior to transfer. Decompensation likely due to pneumonia seen on CT  Pneumonia - on rocephin which should also cover possible SBP. Results of paracentesis from outside hospital are not available and there was not enough ascites for paracentesis this admission. Plan:  1. Per chart review, plans for discharge today  2. Continue lactulose  3. Low Na diet  4. On spironolactone at home. Potassium on admission was 5.1. Now also on furosemide  5. If paracentesis showed SBP he should remain on prophylactic antibiotics after completion of current course of antibiotics  6. Outside records have been requested, but are not available in the chart on rounds today  7. Will request outpatient follow-up in 1-2 weeks       Aroldo Gomez MD, MD    [facility]    [phone].  Also available via [employer]

## 2022-06-18 NOTE — FLOWSHEET NOTE
/69   Pulse 91   Temp 99.2 °F (37.3 °C) (Oral)   Resp 16   Ht 5' 10\" (1.778 m)   Wt 200 lb (90.7 kg)   SpO2 (!) 88%   BMI 28.70 kg/m²     Shift assessment complete; see flowsheets. PM medications administered; see MAR. PRN oxycodone given 7 out of 10 abdominal pain. Abdomen tender and distended. 2L O2 applied per NC d/t desaturation into 80's on room air. Pt AOx4 resting in bed watching television. Respirations even and unlabored. Pt denies any further needs or pain at this time. Call light in reach, bed locked in lowest position.

## 2022-06-18 NOTE — PLAN OF CARE
Problem: Discharge Planning  Goal: Discharge to home or other facility with appropriate resources  6/18/2022 0359 by Mary Birmingham RN  Outcome: Progressing  6/18/2022 0027 by Mary Birmingham RN  Outcome: Progressing  Flowsheets (Taken 6/17/2022 2141)  Discharge to home or other facility with appropriate resources: Identify barriers to discharge with patient and caregiver     Problem: Pain  Goal: Verbalizes/displays adequate comfort level or baseline comfort level  6/18/2022 0359 by Mary Birmingham RN  Outcome: Progressing  6/18/2022 0027 by Mary Birmingham RN  Outcome: Progressing     Problem: Skin/Tissue Integrity  Goal: Absence of new skin breakdown  Description: 1. Monitor for areas of redness and/or skin breakdown  2. Assess vascular access sites hourly  3. Every 4-6 hours minimum:  Change oxygen saturation probe site  4. Every 4-6 hours:  If on nasal continuous positive airway pressure, respiratory therapy assess nares and determine need for appliance change or resting period.   6/18/2022 0359 by Mary Birmingham RN  Outcome: Progressing  6/18/2022 0027 by Mary Birmingham RN  Outcome: Progressing     Problem: Safety - Adult  Goal: Free from fall injury  6/18/2022 0359 by Mary Birmingham RN  Outcome: Progressing  6/18/2022 0027 by Mary Birmingham RN  Outcome: Progressing

## 2022-06-19 LAB
HEPATITIS B SURFACE ANTIGEN CONFIRMATION: POSITIVE
HEPATITIS BE ANTIBODY: NEGATIVE

## 2022-06-20 ENCOUNTER — HOSPITAL ENCOUNTER (INPATIENT)
Age: 57
LOS: 2 days | Discharge: HOME | DRG: 377 | End: 2022-06-22
Attending: EMERGENCY MEDICINE | Admitting: INTERNAL MEDICINE
Payer: MEDICARE

## 2022-06-20 ENCOUNTER — APPOINTMENT (OUTPATIENT)
Dept: CT IMAGING | Age: 57
DRG: 377 | End: 2022-06-20
Payer: MEDICARE

## 2022-06-20 ENCOUNTER — APPOINTMENT (OUTPATIENT)
Dept: GENERAL RADIOLOGY | Age: 57
DRG: 377 | End: 2022-06-20
Payer: MEDICARE

## 2022-06-20 DIAGNOSIS — R53.83 FATIGUE, UNSPECIFIED TYPE: ICD-10-CM

## 2022-06-20 DIAGNOSIS — K62.5 RECTAL BLEEDING: ICD-10-CM

## 2022-06-20 DIAGNOSIS — R10.9 ABDOMINAL PAIN, UNSPECIFIED ABDOMINAL LOCATION: ICD-10-CM

## 2022-06-20 DIAGNOSIS — K62.5 BRBPR (BRIGHT RED BLOOD PER RECTUM): Primary | ICD-10-CM

## 2022-06-20 DIAGNOSIS — R10.84 GENERALIZED ABDOMINAL PAIN: ICD-10-CM

## 2022-06-20 DIAGNOSIS — K70.31 ALCOHOLIC CIRRHOSIS OF LIVER WITH ASCITES (HCC): ICD-10-CM

## 2022-06-20 DIAGNOSIS — R06.02 SHORTNESS OF BREATH: ICD-10-CM

## 2022-06-20 LAB
A/G RATIO: 0.6 (ref 1.1–2.2)
ALBUMIN SERPL-MCNC: 2.8 G/DL (ref 3.4–5)
ALP BLD-CCNC: 120 U/L (ref 40–129)
ALT SERPL-CCNC: 24 U/L (ref 10–40)
AMMONIA: 50 UMOL/L (ref 16–60)
ANION GAP SERPL CALCULATED.3IONS-SCNC: 12 MMOL/L (ref 3–16)
AST SERPL-CCNC: 115 U/L (ref 15–37)
BASOPHILS ABSOLUTE: 0.1 K/UL (ref 0–0.2)
BASOPHILS RELATIVE PERCENT: 1.3 %
BILIRUB SERPL-MCNC: 1.1 MG/DL (ref 0–1)
BILIRUBIN URINE: NEGATIVE
BLOOD, URINE: NEGATIVE
BUN BLDV-MCNC: 10 MG/DL (ref 7–20)
CALCIUM SERPL-MCNC: 9.6 MG/DL (ref 8.3–10.6)
CHLORIDE BLD-SCNC: 104 MMOL/L (ref 99–110)
CLARITY: CLEAR
CO2: 21 MMOL/L (ref 21–32)
COLOR: YELLOW
CREAT SERPL-MCNC: 1.1 MG/DL (ref 0.9–1.3)
EOSINOPHILS ABSOLUTE: 0.1 K/UL (ref 0–0.6)
EOSINOPHILS RELATIVE PERCENT: 1.5 %
GFR AFRICAN AMERICAN: >60
GFR NON-AFRICAN AMERICAN: >60
GLUCOSE BLD-MCNC: 116 MG/DL (ref 70–99)
GLUCOSE URINE: NEGATIVE MG/DL
HBV QNT LOG, IU/ML: 2.72 LOG IU/ML
HBV QNT, IU/ML: 529 IU/ML
HCT VFR BLD CALC: 39.7 % (ref 40.5–52.5)
HCV QNT BY NAAT IU/ML: 722 IU/ML
HCV QNT BY NAAT LOG IU/ML: 2.86 LOG IU/ML
HEMOGLOBIN: 13.7 G/DL (ref 13.5–17.5)
INR BLD: 1.14 (ref 0.87–1.14)
INTERPRETATION: DETECTED
INTERPRETATION: DETECTED
KETONES, URINE: NEGATIVE MG/DL
LACTIC ACID, SEPSIS: 1.9 MMOL/L (ref 0.4–1.9)
LEUKOCYTE ESTERASE, URINE: NEGATIVE
LIPASE: 132 U/L (ref 13–60)
LYMPHOCYTES ABSOLUTE: 1.6 K/UL (ref 1–5.1)
LYMPHOCYTES RELATIVE PERCENT: 21.8 %
MCH RBC QN AUTO: 35.5 PG (ref 26–34)
MCHC RBC AUTO-ENTMCNC: 34.4 G/DL (ref 31–36)
MCV RBC AUTO: 103.2 FL (ref 80–100)
MICROSCOPIC EXAMINATION: ABNORMAL
MONOCYTES ABSOLUTE: 0.9 K/UL (ref 0–1.3)
MONOCYTES RELATIVE PERCENT: 12.8 %
NEUTROPHILS ABSOLUTE: 4.5 K/UL (ref 1.7–7.7)
NEUTROPHILS RELATIVE PERCENT: 62.6 %
NITRITE, URINE: NEGATIVE
OCCULT BLOOD DIAGNOSTIC: ABNORMAL
PDW BLD-RTO: 16.3 % (ref 12.4–15.4)
PH UA: 7.5 (ref 5–8)
PLATELET # BLD: 232 K/UL (ref 135–450)
PMV BLD AUTO: 8.2 FL (ref 5–10.5)
POTASSIUM REFLEX MAGNESIUM: 4.4 MMOL/L (ref 3.5–5.1)
PRO-BNP: 142 PG/ML (ref 0–124)
PROTEIN UA: NEGATIVE MG/DL
PROTHROMBIN TIME: 14.6 SEC (ref 11.7–14.5)
RBC # BLD: 3.85 M/UL (ref 4.2–5.9)
SARS-COV-2, NAAT: NOT DETECTED
SODIUM BLD-SCNC: 137 MMOL/L (ref 136–145)
SPECIFIC GRAVITY UA: 1.01 (ref 1–1.03)
TOTAL PROTEIN: 7.8 G/DL (ref 6.4–8.2)
TROPONIN: <0.01 NG/ML
URINE REFLEX TO CULTURE: ABNORMAL
URINE TYPE: ABNORMAL
UROBILINOGEN, URINE: 2 E.U./DL
WBC # BLD: 7.2 K/UL (ref 4–11)

## 2022-06-20 PROCEDURE — 99285 EMERGENCY DEPT VISIT HI MDM: CPT

## 2022-06-20 PROCEDURE — 81003 URINALYSIS AUTO W/O SCOPE: CPT

## 2022-06-20 PROCEDURE — 2580000003 HC RX 258: Performed by: EMERGENCY MEDICINE

## 2022-06-20 PROCEDURE — 2500000003 HC RX 250 WO HCPCS: Performed by: EMERGENCY MEDICINE

## 2022-06-20 PROCEDURE — 6360000004 HC RX CONTRAST MEDICATION: Performed by: PHYSICIAN ASSISTANT

## 2022-06-20 PROCEDURE — 71045 X-RAY EXAM CHEST 1 VIEW: CPT

## 2022-06-20 PROCEDURE — 82270 OCCULT BLOOD FECES: CPT

## 2022-06-20 PROCEDURE — 93005 ELECTROCARDIOGRAM TRACING: CPT | Performed by: PHYSICIAN ASSISTANT

## 2022-06-20 PROCEDURE — 82140 ASSAY OF AMMONIA: CPT

## 2022-06-20 PROCEDURE — 96374 THER/PROPH/DIAG INJ IV PUSH: CPT

## 2022-06-20 PROCEDURE — 84484 ASSAY OF TROPONIN QUANT: CPT

## 2022-06-20 PROCEDURE — 80053 COMPREHEN METABOLIC PANEL: CPT

## 2022-06-20 PROCEDURE — 6360000002 HC RX W HCPCS: Performed by: EMERGENCY MEDICINE

## 2022-06-20 PROCEDURE — 87635 SARS-COV-2 COVID-19 AMP PRB: CPT

## 2022-06-20 PROCEDURE — 83880 ASSAY OF NATRIURETIC PEPTIDE: CPT

## 2022-06-20 PROCEDURE — 74177 CT ABD & PELVIS W/CONTRAST: CPT

## 2022-06-20 PROCEDURE — 85610 PROTHROMBIN TIME: CPT

## 2022-06-20 PROCEDURE — 85025 COMPLETE CBC W/AUTO DIFF WBC: CPT

## 2022-06-20 PROCEDURE — 83605 ASSAY OF LACTIC ACID: CPT

## 2022-06-20 PROCEDURE — C9113 INJ PANTOPRAZOLE SODIUM, VIA: HCPCS | Performed by: PHYSICIAN ASSISTANT

## 2022-06-20 PROCEDURE — 36415 COLL VENOUS BLD VENIPUNCTURE: CPT

## 2022-06-20 PROCEDURE — 96375 TX/PRO/DX INJ NEW DRUG ADDON: CPT

## 2022-06-20 PROCEDURE — 83690 ASSAY OF LIPASE: CPT

## 2022-06-20 PROCEDURE — 6360000002 HC RX W HCPCS: Performed by: PHYSICIAN ASSISTANT

## 2022-06-20 PROCEDURE — 1200000000 HC SEMI PRIVATE

## 2022-06-20 PROCEDURE — 0W9G3ZZ DRAINAGE OF PERITONEAL CAVITY, PERCUTANEOUS APPROACH: ICD-10-PCS | Performed by: INTERNAL MEDICINE

## 2022-06-20 PROCEDURE — 96376 TX/PRO/DX INJ SAME DRUG ADON: CPT

## 2022-06-20 RX ORDER — OMEPRAZOLE 40 MG/1
CAPSULE, DELAYED RELEASE ORAL
COMMUNITY
Start: 2022-06-02

## 2022-06-20 RX ORDER — MORPHINE SULFATE 4 MG/ML
4 INJECTION, SOLUTION INTRAMUSCULAR; INTRAVENOUS ONCE
Status: COMPLETED | OUTPATIENT
Start: 2022-06-20 | End: 2022-06-20

## 2022-06-20 RX ORDER — ONDANSETRON 2 MG/ML
4 INJECTION INTRAMUSCULAR; INTRAVENOUS ONCE
Status: COMPLETED | OUTPATIENT
Start: 2022-06-20 | End: 2022-06-20

## 2022-06-20 RX ORDER — FAMOTIDINE 10 MG/ML
20 INJECTION, SOLUTION INTRAVENOUS ONCE
Status: COMPLETED | OUTPATIENT
Start: 2022-06-20 | End: 2022-06-20

## 2022-06-20 RX ORDER — LACTULOSE 10 G/15ML
30 SOLUTION ORAL; RECTAL 2 TIMES DAILY
COMMUNITY

## 2022-06-20 RX ORDER — ENTECAVIR 1 MG/1
TABLET, FILM COATED ORAL
COMMUNITY
Start: 2022-06-14

## 2022-06-20 RX ORDER — DOXEPIN HYDROCHLORIDE 10 MG/1
10 CAPSULE ORAL NIGHTLY
COMMUNITY

## 2022-06-20 RX ORDER — PANTOPRAZOLE SODIUM 40 MG/10ML
40 INJECTION, POWDER, LYOPHILIZED, FOR SOLUTION INTRAVENOUS ONCE
Status: COMPLETED | OUTPATIENT
Start: 2022-06-20 | End: 2022-06-20

## 2022-06-20 RX ORDER — ESCITALOPRAM OXALATE 5 MG/1
2.5 TABLET ORAL 4 TIMES DAILY
COMMUNITY

## 2022-06-20 RX ORDER — 0.9 % SODIUM CHLORIDE 0.9 %
500 INTRAVENOUS SOLUTION INTRAVENOUS ONCE
Status: COMPLETED | OUTPATIENT
Start: 2022-06-20 | End: 2022-06-20

## 2022-06-20 RX ORDER — PROPRANOLOL HYDROCHLORIDE 10 MG/1
10 TABLET ORAL 2 TIMES DAILY
COMMUNITY

## 2022-06-20 RX ADMIN — FAMOTIDINE 20 MG: 10 INJECTION, SOLUTION INTRAVENOUS at 20:11

## 2022-06-20 RX ADMIN — SODIUM CHLORIDE 500 ML: 9 INJECTION, SOLUTION INTRAVENOUS at 20:10

## 2022-06-20 RX ADMIN — IOPAMIDOL 75 ML: 755 INJECTION, SOLUTION INTRAVENOUS at 18:38

## 2022-06-20 RX ADMIN — MORPHINE SULFATE 4 MG: 4 INJECTION INTRAVENOUS at 20:11

## 2022-06-20 RX ADMIN — ONDANSETRON HYDROCHLORIDE 4 MG: 2 INJECTION, SOLUTION INTRAMUSCULAR; INTRAVENOUS at 20:11

## 2022-06-20 RX ADMIN — PANTOPRAZOLE SODIUM 40 MG: 40 INJECTION, POWDER, FOR SOLUTION INTRAVENOUS at 17:47

## 2022-06-20 RX ADMIN — ONDANSETRON HYDROCHLORIDE 4 MG: 2 INJECTION, SOLUTION INTRAMUSCULAR; INTRAVENOUS at 17:47

## 2022-06-20 ASSESSMENT — ENCOUNTER SYMPTOMS
ABDOMINAL DISTENTION: 1
CONSTIPATION: 1
SHORTNESS OF BREATH: 1
ANAL BLEEDING: 1
VOMITING: 1
ABDOMINAL PAIN: 1
NAUSEA: 1
COUGH: 1

## 2022-06-20 ASSESSMENT — PAIN - FUNCTIONAL ASSESSMENT: PAIN_FUNCTIONAL_ASSESSMENT: 0-10

## 2022-06-20 ASSESSMENT — PAIN SCALES - GENERAL
PAINLEVEL_OUTOF10: 7
PAINLEVEL_OUTOF10: 7

## 2022-06-20 NOTE — ED PROVIDER NOTES
[facility]        Pt Name: Candida Chiang  MRN: [id number]  [place] 1965  Date of evaluation: 6/20/2022  Provider: Deanna Ngo PA-C  PCP: None Provider    Shared Visit or Autonomous Visit:  I have seen and evaluated this patient with my supervising physician Carla Martinez MD.    [address] [place] [address]       Chief Complaint   Patient presents with    Abdominal Pain     Pt c/o abd pain x 4 days, states hes unable to have a BM. HISTORY OF PRESENT ILLNESS   (Location/Symptom, Timing/Onset, Context/Setting, Quality, Duration, Modifying Factors, Severity)  Note limiting factors. Candida Chiang is a 49 y.o. male presenting to the emergency department for evaluation of generalized abdominal pain, vomiting, shortness of breath, decreased bowel movements, swollen abdomen. States he has been dealing with this for about 18 months. He has a history of hepatitis and liver cirrhosis he is from [facility] had recent paracentesis with removal of several liters of fluid from his abdomen was seen in the ER 4 days ago for abdominal pain found to have pneumonia as well treated with antibiotics admitted into the hospital was released 2 days ago to [facility] for alcoholism when he saw the doctor today and was complaining of abdominal pain and has had rectal bleeding x2 today was sent here for evaluation. Not on any blood thinners. Vomiting x1 today. Last bowel movement was today states only one small amounts at a time. No chest pain. Complains of shortness of breath. No fever. No confusion. The history is provided by the patient.    Abdominal Pain  Pain location:  Generalized  Pain quality: aching and bloating    Pain radiates to:  Does not radiate  Onset quality:  Gradual  Duration:  4 days  Progression:  Unchanged  Chronicity:  New  Relieved by:  Nothing  Associated symptoms: constipation, cough, nausea, shortness of breath and vomiting    Associated symptoms: no chest pain, no dysuria and no fever        Nursing Notes were reviewed    REVIEW OF SYSTEMS    (2-9 systems for level 4, 10 or more for level 5)     Review of Systems   Constitutional: Negative for fever. Respiratory: Positive for cough and shortness of breath. Cardiovascular: Negative for chest pain. Gastrointestinal: Positive for abdominal distention, abdominal pain, anal bleeding, constipation, nausea and vomiting. Genitourinary: Negative for dysuria. All other systems reviewed and are negative. Positives and Pertinent negatives as per HPI.        PAST MEDICAL HISTORY     Past Medical History:   Diagnosis Date    Cirrhosis ([facility] [address]     Hepatitis C          SURGICAL HISTORY     Past Surgical History:   Procedure Laterality Date    APPENDECTOMY      CHOLECYSTECTOMY      LEG AMPUTATION BELOW KNEE Right     TONSILLECTOMY           CURRENTMEDICATIONS       Previous Medications    ALBUTEROL SULFATE HFA (PROVENTIL;VENTOLIN;PROAIR) 108 (90 BASE) MCG/ACT INHALER    Inhale 2 puffs into the lungs every 6 hours as needed for Wheezing or Shortness of Breath    ALUM & MAG HYDROXIDE-SIMETH (MINTOX EXTRA STRENGTH PO)    Take by mouth    DOCUSATE SODIUM (COLACE) 100 MG CAPSULE    Take 100 mg by mouth 2 times daily    ENTECAVIR (BARACLUDE) 0.5 MG TABLET    Take 1 mg by mouth daily    FOLIC ACID (FOLVITE) 1 MG TABLET    Take 1 mg by mouth daily    FUROSEMIDE (LASIX) 40 MG TABLET    Take 1 tablet by mouth daily    HYDROXYZINE PAMOATE (VISTARIL) 50 MG CAPSULE    Take 50 mg by mouth 3 times daily as needed for Itching    LACTULOSE (CHRONULAC) 10 GM/15ML SOLUTION    Take 30 mLs by mouth 2 times daily    LEVOFLOXACIN (LEVAQUIN) 500 MG TABLET    Take 1 tablet by mouth daily for 5 days    MAGNESIUM HYDROXIDE (MILK OF MAGNESIA) 400 MG/5ML SUSPENSION    Take by mouth daily as needed for Constipation    OMEPRAZOLE (PRILOSEC) 20 MG DELAYED RELEASE CAPSULE    Take 20 mg by mouth daily    ONDANSETRON (ZOFRAN) 4 MG TABLET    Take 4 mg by mouth every 8 hours as needed for Nausea or Vomiting    POLYETHYLENE GLYCOL (MIRALAX) 17 G PACK PACKET    Take 17 g by mouth daily    SPIRONOLACTONE (ALDACTONE) 50 MG TABLET    Take 50 mg by mouth daily    TENOFOVIR DISOPROXIL FUMARATE (VIREAD) 300 MG TABLET    Take 300 mg by mouth daily    THIAMINE HCL (B-1) 100 MG TABS    Take by mouth         ALLERGIES     Patient has no known allergies. FAMILYHISTORY     History reviewed. No pertinent family history. SOCIAL HISTORY       Social History     Socioeconomic History    Marital status:      Spouse name: None    Number of children: None    Years of education: None    Highest education level: None   Occupational History    None   Tobacco Use    Smoking status: Current Every Day Smoker     Packs/day: 1.00    Smokeless tobacco: Never Used   Vaping Use    Vaping Use: Never used   Substance and Sexual Activity    Alcohol use: Not Currently    Drug use: Yes     Types: Marijuana Norval Remak)    Sexual activity: None   Other Topics Concern    None   Social History Narrative    None     Social Determinants of Health     Financial Resource Strain:     Difficulty of Paying Living Expenses: Not on file   Food Insecurity:     Worried About Running Out of Food in the Last Year: Not on file    See of Food in the Last Year: Not on file   Transportation Needs:     Lack of Transportation (Medical): Not on file    Lack of Transportation (Non-Medical):  Not on file   Physical Activity:     Days of Exercise per Week: Not on file    Minutes of Exercise per Session: Not on file   Stress:     Feeling of Stress : Not on file   Social Connections:     Frequency of Communication with Friends and Family: Not on file    Frequency of Social Gatherings with Friends and Family: Not on file    Attends Jewish Services: Not on file    Active Member of Clubs or Organizations: Not on file    Attends Club or Organization Meetings: Not on file    Marital Status: Not on file   Intimate Partner Violence:     Fear of Current or Ex-Partner: Not on file    Emotionally Abused: Not on file    Physically Abused: Not on file    Sexually Abused: Not on file   Housing Stability:     Unable to Pay for Housing in the Last Year: Not on file    Number of Sherry in the Last Year: Not on file    Unstable Housing in the Last Year: Not on file       SCREENINGS    Brandon Coma Scale  Eye Opening: Spontaneous  Best Verbal Response: Oriented  Best Motor Response: Obeys commands  Brandon Coma Scale Score: 15        PHYSICAL EXAM    (up to 7 for level 4, 8 or more for level 5)     ED Triage Vitals [06/20/22 1700]   BP Temp Temp src Heart Rate Resp SpO2 Height Weight   117/87 98.8 °F (37.1 °C) -- 83 18 100 % 5' 10\" (1.778 m) 197 lb (89.4 kg)       Physical Exam  Vitals and nursing note reviewed. Exam conducted with a chaperone present. Constitutional:       Appearance: He is well-developed. He is not toxic-appearing. HENT:      Head: Normocephalic and atraumatic. Mouth/Throat:      Pharynx: Oropharynx is clear. No pharyngeal swelling or posterior oropharyngeal erythema. Eyes:      Conjunctiva/sclera: Conjunctivae normal.      Pupils: Pupils are equal, round, and reactive to light. Cardiovascular:      Rate and Rhythm: Normal rate and regular rhythm. Heart sounds: Normal heart sounds. Pulmonary:      Effort: Pulmonary effort is normal. No respiratory distress. Breath sounds: No wheezing or rales. Abdominal:      General: Bowel sounds are normal. There is distension. Palpations: Abdomen is soft. Abdomen is not rigid. Tenderness: There is generalized abdominal tenderness. There is no guarding or rebound. Genitourinary:     Rectum: External hemorrhoid present. No tenderness or anal fissure. Normal anal tone. Comments: Small external hemorrhoid no active bleeding. No rectal tenderness.  Krystina Ayon stool on LANE. Musculoskeletal:         General: Normal range of motion. Cervical back: Normal range of motion and neck supple. Comments: Right BKA   Skin:     General: Skin is warm and dry. Neurological:      Mental Status: He is alert and oriented to person, place, and time. GCS: GCS eye subscore is 4. GCS verbal subscore is 5. GCS motor subscore is 6. Cranial Nerves: No cranial nerve deficit. Sensory: No sensory deficit. Motor: No abnormal muscle tone. Coordination: Coordination normal.   Psychiatric:         Speech: Speech normal.         Behavior: Behavior normal.         Thought Content:  Thought content normal.         DIAGNOSTIC RESULTS   LABS:    Labs Reviewed   CBC WITH AUTO DIFFERENTIAL - Abnormal; Notable for the following components:       Result Value    RBC 3.85 (*)     Hematocrit 39.7 (*)     .2 (*)     MCH 35.5 (*)     RDW 16.3 (*)     All other components within normal limits   COMPREHENSIVE METABOLIC PANEL W/ REFLEX TO MG FOR LOW K - Abnormal; Notable for the following components:    Glucose 116 (*)     Albumin 2.8 (*)     Albumin/Globulin Ratio 0.6 (*)     Total Bilirubin 1.1 (*)      (*)     All other components within normal limits   LIPASE - Abnormal; Notable for the following components:    Lipase 132.0 (*)     All other components within normal limits   BRAIN NATRIURETIC PEPTIDE - Abnormal; Notable for the following components:    Pro- (*)     All other components within normal limits   PROTIME-INR - Abnormal; Notable for the following components:    Protime 14.6 (*)     All other components within normal limits   BLOOD OCCULT STOOL DIAGNOSTIC - Abnormal; Notable for the following components:    Occult Blood Diagnostic   (*)     Value: Result: POSITIVE  Normal range: Negative      All other components within normal limits    Narrative:     ORDER#: [id number]                          ORDERED BY: WENDY LINARES  SOURCE: Stool COLLECTED:  06/20/22 17:44  ANTIBIOTICS AT [facility].:                      RECEIVED :  06/20/22 17:50   URINALYSIS WITH REFLEX TO CULTURE - Abnormal; Notable for the following components:    Urobilinogen, Urine 2.0 (*)     All other components within normal limits   COVID-19, RAPID   LACTATE, SEPSIS   TROPONIN   AMMONIA   LACTATE, SEPSIS     Results for orders placed or performed during the hospital encounter of 06/20/22   COVID-19, Rapid    Specimen: Nasopharyngeal Swab   Result Value Ref Range    SARS-CoV-2, NAAT Not Detected Not Detected   Lactate, Sepsis   Result Value Ref Range    Lactic Acid, Sepsis 1.9 0.4 - 1.9 mmol/L   CBC with Auto Differential   Result Value Ref Range    WBC 7.2 4.0 - 11.0 K/uL    RBC 3.85 (L) 4.20 - 5.90 M/uL    Hemoglobin 13.7 13.5 - 17.5 g/dL    Hematocrit 39.7 (L) 40.5 - 52.5 %    .2 (H) 80.0 - 100.0 fL    MCH 35.5 (H) 26.0 - 34.0 pg    MCHC 34.4 31.0 - 36.0 g/dL    RDW 16.3 (H) 12.4 - 15.4 %    Platelets 363 842 - 114 K/uL    MPV 8.2 5.0 - 10.5 fL    Neutrophils % 62.6 %    Lymphocytes % 21.8 %    Monocytes % 12.8 %    Eosinophils % 1.5 %    Basophils % 1.3 %    Neutrophils Absolute 4.5 1.7 - 7.7 K/uL    Lymphocytes Absolute 1.6 1.0 - 5.1 K/uL    Monocytes Absolute 0.9 0.0 - 1.3 K/uL    Eosinophils Absolute 0.1 0.0 - 0.6 K/uL    Basophils Absolute 0.1 0.0 - 0.2 K/uL   Comprehensive Metabolic Panel w/ Reflex to MG   Result Value Ref Range    Sodium 137 136 - 145 mmol/L    Potassium reflex Magnesium 4.4 3.5 - 5.1 mmol/L    Chloride 104 99 - 110 mmol/L    CO2 21 21 - 32 mmol/L    Anion Gap 12 3 - 16    Glucose 116 (H) 70 - 99 mg/dL    BUN 10 7 - 20 mg/dL    CREATININE 1.1 0.9 - 1.3 mg/dL    GFR Non-African American >60 >60    GFR African American >60 >60    Calcium 9.6 8.3 - 10.6 mg/dL    Total Protein 7.8 6.4 - 8.2 g/dL    Albumin 2.8 (L) 3.4 - 5.0 g/dL    Albumin/Globulin Ratio 0.6 (L) 1.1 - 2.2    Total Bilirubin 1.1 (H) 0.0 - 1.0 mg/dL    Alkaline Phosphatase 120 40 - 129 U/L    ALT 24 10 - 40 U/L     (H) 15 - 37 U/L   Lipase   Result Value Ref Range    Lipase 132.0 (H) 13.0 - 60.0 U/L   Troponin   Result Value Ref Range    Troponin <0.01 <0.01 ng/mL   Brain Natriuretic Peptide   Result Value Ref Range    Pro- (H) 0 - 124 pg/mL   Ammonia   Result Value Ref Range    Ammonia 50 16 - 60 umol/L   Protime-INR   Result Value Ref Range    Protime 14.6 (H) 11.7 - 14.5 sec    INR 1.14 0.87 - 1.14   Blood occult stool #1   Result Value Ref Range    Occult Blood Diagnostic Result: POSITIVE  Normal range: Negative   (A)    Urinalysis with Reflex to Culture    Specimen: Urine   Result Value Ref Range    Color, UA Yellow Straw/Yellow    Clarity, UA Clear Clear    Glucose, Ur Negative Negative mg/dL    Bilirubin Urine Negative Negative    Ketones, Urine Negative Negative mg/dL    Specific Gravity, UA 1.010 1.005 - 1.030    Blood, Urine Negative Negative    pH, UA 7.5 5.0 - 8.0    Protein, UA Negative Negative mg/dL    Urobilinogen, Urine 2.0 (A) <2.0 E.U./dL    Nitrite, Urine Negative Negative    Leukocyte Esterase, Urine Negative Negative    Microscopic Examination Not Indicated     Urine Type NotGiven     Urine Reflex to Culture Not Indicated    EKG 12 Lead   Result Value Ref Range    Ventricular Rate 79 BPM    Atrial Rate 79 BPM    P-R Interval 170 ms    QRS Duration 86 ms    Q-T Interval 396 ms    QTc Calculation (Bazett) 454 ms    P Axis 37 degrees    R Axis -49 degrees    T Axis 45 degrees    Diagnosis       Normal sinus rhythmLeft anterior fascicular blockAbnormal ECGWhen compared with ECG of 16-JUN-2022 18:06,No significant change was found       All other labs were within normal range or not returned as of this dictation. EKG: All EKG's are interpreted by the Emergency Department Physician in the absence of a cardiologist.  Please see their note for interpretation of EKG.       RADIOLOGY:   Non-plain film images such as CT, Ultrasound and MRI are read by the radiologist. Familia Sheehan radiographic images are visualized andpreliminarily interpreted by the  ED Provider with the below findings:        Interpretation perthe Radiologist below, if available at the time of this note:    CT ABDOMEN PELVIS W IV CONTRAST Additional Contrast? None   Final Result   No acute abnormality. Hepatomegaly with suspected mild steatosis. Splenomegaly could be due to portal hypertension. Sigmoid diverticulosis   without definite acute diverticulitis. XR CHEST PORTABLE   Final Result   No acute process. CT ABDOMEN PELVIS W IV CONTRAST Additional Contrast? None    Result Date: 6/20/2022  EXAMINATION: CT OF THE ABDOMEN AND PELVIS WITH CONTRAST 6/20/2022 6:26 pm TECHNIQUE: CT of the abdomen and pelvis was performed with the administration of 75 mL Isovue 370 intravenous contrast. Multiplanar reformatted images are provided for review. Automated exposure control, iterative reconstruction, and/or weight based adjustment of the mA/kV was utilized to reduce the radiation dose to as low as reasonably achievable. COMPARISON: 06/16/2022 HISTORY: Abdominal pain and swelling with nausea/vomiting. Cirrhosis. FINDINGS: Motion artifact limits evaluation of the pelvis. Lower Chest: Bibasilar atelectasis/scarring. Organs: Hepatomegaly with suspected mild steatosis. Spleen is enlarged, measuring 16.8 cm in craniocaudal dimension. Cholecystectomy. Small renal cysts; no follow-up imaging recommended. Remaining solid organs are unremarkable. GI/Bowel: Stable small hiatal hernia. Stomach and small bowel are unremarkable. The appendix has been removed. Sigmoid diverticulosis without definite acute inflammation. Pelvis: Bladder and prostate are unremarkable. No lymphadenopathy or free fluid. Peritoneum/Retroperitoneum: No ascites or significant lymphadenopathy. Atherosclerotic disease throughout the abdominal aorta without aneurysm. Bones/Soft Tissues: Unremarkable. No acute abnormality. Hepatomegaly with suspected mild steatosis. Splenomegaly could be due to portal hypertension. Sigmoid diverticulosis without definite acute diverticulitis. XR CHEST PORTABLE    Result Date: 6/20/2022  EXAMINATION: ONE XRAY VIEW OF THE CHEST 6/20/2022 6:05 pm COMPARISON: June 16, 2022 HISTORY: ORDERING SYSTEM PROVIDED HISTORY: shortness of breath TECHNOLOGIST PROVIDED HISTORY: Reason for exam:->shortness of breath Reason for Exam: SOB FINDINGS: The lungs are without acute focal process. There is no effusion or pneumothorax. The cardiomediastinal silhouette is without acute process. The osseous structures are without acute process. No acute process. PROCEDURES   Unless otherwise noted below, none     Procedures    CRITICAL CARE TIME   Critical care provided for this patient of which 0 min were spend on critical care and decision making. 0 min spent on procedures. There was imminent failure of an organ system which required critical intervention to prevent clinically significant progression of life threatening deterioration of the patient's condition to the point of disability or death.       CONSULTS:  IP CONSULT TO HOSPITALIST      EMERGENCY DEPARTMENT COURSE and DIFFERENTIAL DIAGNOSIS/MDM:   Vitals:    Vitals:    06/20/22 1700 06/20/22 1807 06/20/22 1918   BP: 117/87 112/84 103/69   Pulse: 83 59 76   Resp: 18 14 16   Temp: 98.8 °F (37.1 °C)     SpO2: 100% 98% 94%   Weight: 197 lb (89.4 kg)     Height: 5' 10\" (1.778 m)         Patient was given thefollowing medications:  Medications   0.9 % sodium chloride bolus (500 mLs IntraVENous New Bag 6/20/22 2010)   ondansetron (ZOFRAN) injection 4 mg (4 mg IntraVENous Given 6/20/22 1747)   pantoprazole (PROTONIX) injection 40 mg (40 mg IntraVENous Given 6/20/22 1747)   iopamidol (ISOVUE-370) 76 % injection 75 mL (75 mLs IntraVENous Given 6/20/22 1838)   morphine sulfate (PF) injection 4 mg (4 mg IntraVENous Given 6/20/22 2011)   ondansetron (ZOFRAN) injection 4 mg (4 mg IntraVENous Given 6/20/22 2011)   famotidine (PEPCID) injection 20 mg (20 mg IntraVENous Given 6/20/22 2011)       Is this patient to be included in the SEP-1 Core Measure due to severe sepsis or septic shock? No   Exclusion criteria - the patient is NOT to be included for SEP-1 Core Measure due to: Infection is not suspected         ED course  Patient presented to the ER for evaluation of abdominal pain, abdominal swelling, shortness of breath, vomiting and rectal bleeding. History of liver cirrhosis with ascites and recent paracentesis in [facility]. Recent admission in the hospital for abdominal pain and pneumonia and was released 2 days ago to [facility] for alcoholism he was evaluated by the doctor there today and when he told them about his pain and other symptoms they advised him to come back to the hospital for evaluation. On exam he has generalized abdominal tenderness with distention but no obvious ascites. Hemoccult stool positive. White count 7.2. Hemoglobin is 13.7. Lipase a little elevated at 132. Pro time 14.6. INR 1. 14. Ammonia level is 50. CT scan abdomen pelvis no acute abnormality. Hepatomegaly, diffuse hepatic steatosis, diverticulosis. Chest x-ray no acute process. Concern with rectal bleeding and liver cirrhosis possible admission for GI evaluation and scope. FINAL IMPRESSION      1. BRBPR (bright red blood per rectum)    2. Generalized abdominal pain    3. Alcoholic cirrhosis of liver with ascites ([address]    4. Shortness of breath    5. Fatigue, unspecified type          DISPOSITION/PLAN   DISPOSITION     PATIENT REFERREDTO:  No follow-up provider specified.     DISCHARGE MEDICATIONS:  New Prescriptions    No medications on file       DISCONTINUED MEDICATIONS:  Discontinued Medications    No medications on file              (Please note that portions ofthis note were completed with a voice recognition program.  Efforts were made to edit the dictations but occasionally words are mis-transcribed.)    Bethel Castleman, PA-C (electronically signed)            Herman Mcnair PA-C  06/20/22 2055

## 2022-06-20 NOTE — ED PROVIDER NOTES
I independently performed a history and physical on [employer]. All diagnostic, treatment, and disposition decisions were made by myself in conjunction with the advanced practice provider. I personally saw the patient and performed a substantive portion of the visit including all aspects of the medical decision making. For further details of Natalya Gilbert emergency department encounter, please see ANNE Hodge's documentation. Patient is a 53-year-old male presenting today due to concern for significant abdominal pain worsening over the last couple of days but noticing to bright red bloody episodes when sitting on the toilet but denying any actual stool with this. He did also have vomiting today but denies any hematemesis. He denies any chest pain but does feel more short of breath over the last couple of days. He was just admitted a few days ago for abdominal pain but states the rectal bleeding was new today. He denies any recent endoscopy or colonoscopy. He does report having small bowel movements when trying to go. He denies any fever or headache. No sore throat. He denies any falls or trauma. No unilateral numbness or weakness but he does feel weak all over. Due to concern for significant abdominal pain, he came to the ED for further evaluation. He does report his pain has worsened since he was initially seen and is currently at 8.5 out of 10    Physical:   Gen: No acute distress. AOx3.   Psych: Anxious mood and affect  HEENT: NCAT, PERRL, dry MM  Neck: supple, normal ROM   Cardiac: RRR, pulses 2+ in upper extremities, no calf swelling or tenderness to left lower extremity, right leg BKA noted  Lungs: C2AB, no R/R/W  Abdomen: soft and mild tenderness tenderness with no R/G, mild distention  Neuro: Normal strength to bilateral upper extremities and the left lower extremity, GCS equals 15, moving right lower extremity but limited due to prior amputation    The Ekg interpreted by me shows  normal sinus rhythm with a rate of 79  Axis is   Left axis deviation  QTc is  within an acceptable range  Intervals and Durations are unremarkable. ST Segments: no acute change and nonspecific changes  No significant change from prior EKG dated - 6/16/22  No STEMI       MDM: Patient was evaluated due to developing significant abdominal pain today but also having bright red blood per rectum and having a known history of cirrhosis. He was reporting worsening pain when I saw him and therefore I did order morphine along with Zofran along with some IV fluids. Otherwise, he will need further evaluation in the hospital with GI consultation due to concern for rectal bleeding. He was stable for the floor with telemetry and agrees with this plan. He denied any chest pain but did report shortness of breath. Troponin was negative and story not suggestive of acute coronary syndrome or pulmonary embolism. His lipase was elevated which may explain his abdominal pain. I did speak to Dr. Yg Early at 1767 and he accepted the patient for admission.        Ac Newton MD  06/21/22 [id number]

## 2022-06-20 NOTE — ED PROVIDER NOTES
ECG    The Ekg interpreted by me shows sinus rhythm with a rate of 79 bpm.  Left axis deviation. Left anterior fascicular block. No acute injury pattern. , QRS 86, QTc 454.      Ramon Calderon DO  06/20/22 1736

## 2022-06-21 ENCOUNTER — ANESTHESIA EVENT (OUTPATIENT)
Dept: ENDOSCOPY | Age: 57
DRG: 377 | End: 2022-06-21
Payer: MEDICARE

## 2022-06-21 ENCOUNTER — APPOINTMENT (OUTPATIENT)
Dept: ULTRASOUND IMAGING | Age: 57
DRG: 377 | End: 2022-06-21
Payer: MEDICARE

## 2022-06-21 LAB
A/G RATIO: 0.7 (ref 1.1–2.2)
ALBUMIN SERPL-MCNC: 3.1 G/DL (ref 3.4–5)
ALP BLD-CCNC: 120 U/L (ref 40–129)
ALT SERPL-CCNC: 24 U/L (ref 10–40)
ANION GAP SERPL CALCULATED.3IONS-SCNC: 12 MMOL/L (ref 3–16)
AST SERPL-CCNC: 107 U/L (ref 15–37)
BASOPHILS ABSOLUTE: 0.1 K/UL (ref 0–0.2)
BASOPHILS RELATIVE PERCENT: 1 %
BILIRUB SERPL-MCNC: 1.3 MG/DL (ref 0–1)
BUN BLDV-MCNC: 10 MG/DL (ref 7–20)
CALCIUM SERPL-MCNC: 9.4 MG/DL (ref 8.3–10.6)
CHLORIDE BLD-SCNC: 103 MMOL/L (ref 99–110)
CO2: 21 MMOL/L (ref 21–32)
CREAT SERPL-MCNC: 1.1 MG/DL (ref 0.9–1.3)
EKG ATRIAL RATE: 79 BPM
EKG DIAGNOSIS: NORMAL
EKG P AXIS: 37 DEGREES
EKG P-R INTERVAL: 170 MS
EKG Q-T INTERVAL: 396 MS
EKG QRS DURATION: 86 MS
EKG QTC CALCULATION (BAZETT): 454 MS
EKG R AXIS: -49 DEGREES
EKG T AXIS: 45 DEGREES
EKG VENTRICULAR RATE: 79 BPM
EOSINOPHILS ABSOLUTE: 0.2 K/UL (ref 0–0.6)
EOSINOPHILS RELATIVE PERCENT: 2.5 %
GFR AFRICAN AMERICAN: >60
GFR NON-AFRICAN AMERICAN: >60
GLUCOSE BLD-MCNC: 98 MG/DL (ref 70–99)
HCT VFR BLD CALC: 38 % (ref 40.5–52.5)
HCT VFR BLD CALC: 39.4 % (ref 40.5–52.5)
HCT VFR BLD CALC: 40.1 % (ref 40.5–52.5)
HCT VFR BLD CALC: 42.3 % (ref 40.5–52.5)
HEMOGLOBIN: 12.9 G/DL (ref 13.5–17.5)
HEMOGLOBIN: 13.3 G/DL (ref 13.5–17.5)
HEMOGLOBIN: 13.5 G/DL (ref 13.5–17.5)
HEMOGLOBIN: 14.3 G/DL (ref 13.5–17.5)
HEPATITIS BE ANTIGEN: NEGATIVE
IRON SATURATION: 21 % (ref 20–50)
IRON: 62 UG/DL (ref 59–158)
LYMPHOCYTES ABSOLUTE: 1.6 K/UL (ref 1–5.1)
LYMPHOCYTES RELATIVE PERCENT: 25.4 %
MCH RBC QN AUTO: 35.8 PG (ref 26–34)
MCHC RBC AUTO-ENTMCNC: 34 G/DL (ref 31–36)
MCV RBC AUTO: 105.2 FL (ref 80–100)
MONOCYTES ABSOLUTE: 1 K/UL (ref 0–1.3)
MONOCYTES RELATIVE PERCENT: 14.9 %
NEUTROPHILS ABSOLUTE: 3.6 K/UL (ref 1.7–7.7)
NEUTROPHILS RELATIVE PERCENT: 56.2 %
PDW BLD-RTO: 16.1 % (ref 12.4–15.4)
PLATELET # BLD: 220 K/UL (ref 135–450)
PMV BLD AUTO: 8.3 FL (ref 5–10.5)
POTASSIUM REFLEX MAGNESIUM: 3.9 MMOL/L (ref 3.5–5.1)
RBC # BLD: 3.61 M/UL (ref 4.2–5.9)
SODIUM BLD-SCNC: 136 MMOL/L (ref 136–145)
TOTAL IRON BINDING CAPACITY: 302 UG/DL (ref 260–445)
TOTAL PROTEIN: 7.6 G/DL (ref 6.4–8.2)
WBC # BLD: 6.5 K/UL (ref 4–11)

## 2022-06-21 PROCEDURE — 6370000000 HC RX 637 (ALT 250 FOR IP): Performed by: INTERNAL MEDICINE

## 2022-06-21 PROCEDURE — 1200000000 HC SEMI PRIVATE

## 2022-06-21 PROCEDURE — 36415 COLL VENOUS BLD VENIPUNCTURE: CPT

## 2022-06-21 PROCEDURE — 83540 ASSAY OF IRON: CPT

## 2022-06-21 PROCEDURE — 85014 HEMATOCRIT: CPT

## 2022-06-21 PROCEDURE — 85018 HEMOGLOBIN: CPT

## 2022-06-21 PROCEDURE — 2580000003 HC RX 258: Performed by: INTERNAL MEDICINE

## 2022-06-21 PROCEDURE — 85025 COMPLETE CBC W/AUTO DIFF WBC: CPT

## 2022-06-21 PROCEDURE — 76705 ECHO EXAM OF ABDOMEN: CPT

## 2022-06-21 PROCEDURE — 6360000002 HC RX W HCPCS: Performed by: INTERNAL MEDICINE

## 2022-06-21 PROCEDURE — 93010 ELECTROCARDIOGRAM REPORT: CPT | Performed by: INTERNAL MEDICINE

## 2022-06-21 PROCEDURE — 6370000000 HC RX 637 (ALT 250 FOR IP): Performed by: REGISTERED NURSE

## 2022-06-21 PROCEDURE — 83550 IRON BINDING TEST: CPT

## 2022-06-21 PROCEDURE — 94640 AIRWAY INHALATION TREATMENT: CPT

## 2022-06-21 PROCEDURE — 94761 N-INVAS EAR/PLS OXIMETRY MLT: CPT

## 2022-06-21 PROCEDURE — 80053 COMPREHEN METABOLIC PANEL: CPT

## 2022-06-21 RX ORDER — ALBUTEROL SULFATE 90 UG/1
2 AEROSOL, METERED RESPIRATORY (INHALATION) 4 TIMES DAILY PRN
Status: DISCONTINUED | OUTPATIENT
Start: 2022-06-21 | End: 2022-06-21

## 2022-06-21 RX ORDER — ENTECAVIR 1 MG/1
1 TABLET, FILM COATED ORAL DAILY
Status: DISCONTINUED | OUTPATIENT
Start: 2022-06-21 | End: 2022-06-22 | Stop reason: HOSPADM

## 2022-06-21 RX ORDER — LACTULOSE 10 G/15ML
30 SOLUTION ORAL 2 TIMES DAILY
Status: DISCONTINUED | OUTPATIENT
Start: 2022-06-21 | End: 2022-06-22 | Stop reason: HOSPADM

## 2022-06-21 RX ORDER — PROPRANOLOL HYDROCHLORIDE 10 MG/1
10 TABLET ORAL 2 TIMES DAILY
Status: DISCONTINUED | OUTPATIENT
Start: 2022-06-21 | End: 2022-06-22 | Stop reason: HOSPADM

## 2022-06-21 RX ORDER — ALBUTEROL SULFATE 90 UG/1
2 AEROSOL, METERED RESPIRATORY (INHALATION) 3 TIMES DAILY
Status: DISCONTINUED | OUTPATIENT
Start: 2022-06-21 | End: 2022-06-22 | Stop reason: HOSPADM

## 2022-06-21 RX ORDER — ONDANSETRON 4 MG/1
4 TABLET, ORALLY DISINTEGRATING ORAL EVERY 8 HOURS PRN
Status: DISCONTINUED | OUTPATIENT
Start: 2022-06-21 | End: 2022-06-22 | Stop reason: HOSPADM

## 2022-06-21 RX ORDER — ONDANSETRON 2 MG/ML
4 INJECTION INTRAMUSCULAR; INTRAVENOUS EVERY 6 HOURS PRN
Status: DISCONTINUED | OUTPATIENT
Start: 2022-06-21 | End: 2022-06-22 | Stop reason: HOSPADM

## 2022-06-21 RX ORDER — NICOTINE 21 MG/24HR
1 PATCH, TRANSDERMAL 24 HOURS TRANSDERMAL DAILY
Status: DISCONTINUED | OUTPATIENT
Start: 2022-06-21 | End: 2022-06-22 | Stop reason: HOSPADM

## 2022-06-21 RX ORDER — PANTOPRAZOLE SODIUM 40 MG/1
40 TABLET, DELAYED RELEASE ORAL
Status: DISCONTINUED | OUTPATIENT
Start: 2022-06-21 | End: 2022-06-22

## 2022-06-21 RX ORDER — SODIUM CHLORIDE 0.9 % (FLUSH) 0.9 %
5-40 SYRINGE (ML) INJECTION PRN
Status: DISCONTINUED | OUTPATIENT
Start: 2022-06-21 | End: 2022-06-22 | Stop reason: HOSPADM

## 2022-06-21 RX ORDER — ACETAMINOPHEN 650 MG/1
650 SUPPOSITORY RECTAL EVERY 6 HOURS PRN
Status: DISCONTINUED | OUTPATIENT
Start: 2022-06-21 | End: 2022-06-22 | Stop reason: HOSPADM

## 2022-06-21 RX ORDER — SPIRONOLACTONE 25 MG/1
50 TABLET ORAL DAILY
Status: DISCONTINUED | OUTPATIENT
Start: 2022-06-21 | End: 2022-06-22 | Stop reason: HOSPADM

## 2022-06-21 RX ORDER — DOXEPIN HYDROCHLORIDE 10 MG/1
10 CAPSULE ORAL NIGHTLY
Status: DISCONTINUED | OUTPATIENT
Start: 2022-06-21 | End: 2022-06-22 | Stop reason: HOSPADM

## 2022-06-21 RX ORDER — LEVOFLOXACIN 500 MG/1
500 TABLET, FILM COATED ORAL DAILY
Status: DISCONTINUED | OUTPATIENT
Start: 2022-06-21 | End: 2022-06-22 | Stop reason: HOSPADM

## 2022-06-21 RX ORDER — ESCITALOPRAM OXALATE 10 MG/1
2.5 TABLET ORAL 4 TIMES DAILY
Status: DISCONTINUED | OUTPATIENT
Start: 2022-06-21 | End: 2022-06-22 | Stop reason: HOSPADM

## 2022-06-21 RX ORDER — SODIUM CHLORIDE 9 MG/ML
INJECTION, SOLUTION INTRAVENOUS PRN
Status: DISCONTINUED | OUTPATIENT
Start: 2022-06-21 | End: 2022-06-22 | Stop reason: HOSPADM

## 2022-06-21 RX ORDER — SODIUM CHLORIDE 0.9 % (FLUSH) 0.9 %
5-40 SYRINGE (ML) INJECTION EVERY 12 HOURS SCHEDULED
Status: DISCONTINUED | OUTPATIENT
Start: 2022-06-21 | End: 2022-06-22 | Stop reason: HOSPADM

## 2022-06-21 RX ORDER — HYDROCODONE BITARTRATE AND ACETAMINOPHEN 5; 325 MG/1; MG/1
1 TABLET ORAL EVERY 6 HOURS PRN
Status: DISCONTINUED | OUTPATIENT
Start: 2022-06-21 | End: 2022-06-22 | Stop reason: HOSPADM

## 2022-06-21 RX ORDER — ACETAMINOPHEN 325 MG/1
650 TABLET ORAL EVERY 6 HOURS PRN
Status: DISCONTINUED | OUTPATIENT
Start: 2022-06-21 | End: 2022-06-22 | Stop reason: HOSPADM

## 2022-06-21 RX ORDER — FOLIC ACID 1 MG/1
1 TABLET ORAL DAILY
Status: DISCONTINUED | OUTPATIENT
Start: 2022-06-21 | End: 2022-06-22 | Stop reason: HOSPADM

## 2022-06-21 RX ORDER — TENOFOVIR DISOPROXIL FUMARATE 300 MG/1
300 TABLET, FILM COATED ORAL DAILY
Status: DISCONTINUED | OUTPATIENT
Start: 2022-06-21 | End: 2022-06-22 | Stop reason: HOSPADM

## 2022-06-21 RX ORDER — LANOLIN ALCOHOL/MO/W.PET/CERES
100 CREAM (GRAM) TOPICAL DAILY
Status: DISCONTINUED | OUTPATIENT
Start: 2022-06-21 | End: 2022-06-22 | Stop reason: HOSPADM

## 2022-06-21 RX ORDER — SODIUM CHLORIDE 9 MG/ML
INJECTION, SOLUTION INTRAVENOUS CONTINUOUS
Status: DISCONTINUED | OUTPATIENT
Start: 2022-06-21 | End: 2022-06-22 | Stop reason: HOSPADM

## 2022-06-21 RX ORDER — FUROSEMIDE 40 MG/1
40 TABLET ORAL DAILY
Status: DISCONTINUED | OUTPATIENT
Start: 2022-06-21 | End: 2022-06-22 | Stop reason: HOSPADM

## 2022-06-21 RX ADMIN — PANTOPRAZOLE SODIUM 40 MG: 40 TABLET, DELAYED RELEASE ORAL at 05:43

## 2022-06-21 RX ADMIN — PROPRANOLOL HYDROCHLORIDE 10 MG: 10 TABLET ORAL at 08:44

## 2022-06-21 RX ADMIN — Medication 2 PUFF: at 19:06

## 2022-06-21 RX ADMIN — HYDROCODONE BITARTRATE AND ACETAMINOPHEN 1 TABLET: 5; 325 TABLET ORAL at 20:09

## 2022-06-21 RX ADMIN — Medication 100 MG: at 08:44

## 2022-06-21 RX ADMIN — HYDROMORPHONE HYDROCHLORIDE 0.5 MG: 1 INJECTION, SOLUTION INTRAMUSCULAR; INTRAVENOUS; SUBCUTANEOUS at 10:00

## 2022-06-21 RX ADMIN — Medication 2 PUFF: at 15:39

## 2022-06-21 RX ADMIN — SODIUM CHLORIDE: 9 INJECTION, SOLUTION INTRAVENOUS at 04:49

## 2022-06-21 RX ADMIN — HYDROMORPHONE HYDROCHLORIDE 0.5 MG: 1 INJECTION, SOLUTION INTRAMUSCULAR; INTRAVENOUS; SUBCUTANEOUS at 14:59

## 2022-06-21 RX ADMIN — ONDANSETRON HYDROCHLORIDE 4 MG: 2 INJECTION, SOLUTION INTRAMUSCULAR; INTRAVENOUS at 05:43

## 2022-06-21 RX ADMIN — DOXEPIN HYDROCHLORIDE 10 MG: 10 CAPSULE ORAL at 20:09

## 2022-06-21 RX ADMIN — LEVOFLOXACIN 500 MG: 500 TABLET, FILM COATED ORAL at 08:43

## 2022-06-21 RX ADMIN — POLYETHYLENE GLYCOL 3350, SODIUM SULFATE ANHYDROUS, SODIUM BICARBONATE, SODIUM CHLORIDE, POTASSIUM CHLORIDE 4000 ML: 236; 22.74; 6.74; 5.86; 2.97 POWDER, FOR SOLUTION ORAL at 18:01

## 2022-06-21 RX ADMIN — HYDROMORPHONE HYDROCHLORIDE 0.25 MG: 1 INJECTION, SOLUTION INTRAMUSCULAR; INTRAVENOUS; SUBCUTANEOUS at 05:43

## 2022-06-21 RX ADMIN — ONDANSETRON HYDROCHLORIDE 4 MG: 2 INJECTION, SOLUTION INTRAMUSCULAR; INTRAVENOUS at 13:34

## 2022-06-21 RX ADMIN — HYDROCODONE BITARTRATE AND ACETAMINOPHEN 1 TABLET: 5; 325 TABLET ORAL at 13:35

## 2022-06-21 RX ADMIN — SODIUM CHLORIDE: 9 INJECTION, SOLUTION INTRAVENOUS at 11:09

## 2022-06-21 RX ADMIN — PROPRANOLOL HYDROCHLORIDE 10 MG: 10 TABLET ORAL at 20:08

## 2022-06-21 RX ADMIN — SPIRONOLACTONE 50 MG: 25 TABLET ORAL at 08:44

## 2022-06-21 RX ADMIN — HYDROMORPHONE HYDROCHLORIDE 0.5 MG: 1 INJECTION, SOLUTION INTRAMUSCULAR; INTRAVENOUS; SUBCUTANEOUS at 18:08

## 2022-06-21 RX ADMIN — FOLIC ACID 1 MG: 1 TABLET ORAL at 08:43

## 2022-06-21 RX ADMIN — FUROSEMIDE 40 MG: 40 TABLET ORAL at 08:43

## 2022-06-21 RX ADMIN — LACTULOSE 30 G: 10 SOLUTION ORAL at 20:11

## 2022-06-21 RX ADMIN — HYDROMORPHONE HYDROCHLORIDE 0.5 MG: 1 INJECTION, SOLUTION INTRAMUSCULAR; INTRAVENOUS; SUBCUTANEOUS at 21:47

## 2022-06-21 RX ADMIN — LACTULOSE 30 G: 10 SOLUTION ORAL at 08:41

## 2022-06-21 ASSESSMENT — PAIN DESCRIPTION - DESCRIPTORS
DESCRIPTORS: ACHING;CRAMPING
DESCRIPTORS: ACHING
DESCRIPTORS: THROBBING
DESCRIPTORS: CRAMPING;ACHING
DESCRIPTORS: CRAMPING

## 2022-06-21 ASSESSMENT — PAIN DESCRIPTION - ORIENTATION
ORIENTATION: RIGHT;LEFT;MID
ORIENTATION: LEFT;MID;RIGHT
ORIENTATION: LEFT;MID
ORIENTATION: MID
ORIENTATION: MID;RIGHT;LEFT

## 2022-06-21 ASSESSMENT — PAIN SCALES - GENERAL
PAINLEVEL_OUTOF10: 8
PAINLEVEL_OUTOF10: 8
PAINLEVEL_OUTOF10: 6
PAINLEVEL_OUTOF10: 4
PAINLEVEL_OUTOF10: 7
PAINLEVEL_OUTOF10: 7
PAINLEVEL_OUTOF10: 8

## 2022-06-21 ASSESSMENT — PAIN DESCRIPTION - LOCATION
LOCATION: ABDOMEN
LOCATION: ABDOMEN;BACK
LOCATION: ABDOMEN;LEG;HAND
LOCATION: ABDOMEN;HAND;LEG
LOCATION: ABDOMEN;LEG;HEAD
LOCATION: ARM;LEG
LOCATION: ARM;LEG
LOCATION: ABDOMEN;BACK

## 2022-06-21 ASSESSMENT — PAIN DESCRIPTION - ONSET
ONSET: ON-GOING
ONSET: ON-GOING

## 2022-06-21 ASSESSMENT — PAIN DESCRIPTION - PAIN TYPE
TYPE: CHRONIC PAIN;ACUTE PAIN
TYPE: CHRONIC PAIN

## 2022-06-21 ASSESSMENT — PAIN - FUNCTIONAL ASSESSMENT
PAIN_FUNCTIONAL_ASSESSMENT: ACTIVITIES ARE NOT PREVENTED

## 2022-06-21 ASSESSMENT — PAIN DESCRIPTION - FREQUENCY
FREQUENCY: CONTINUOUS
FREQUENCY: CONTINUOUS

## 2022-06-21 ASSESSMENT — LIFESTYLE VARIABLES: SMOKING_STATUS: 1

## 2022-06-21 NOTE — CARE COORDINATION
Case Management Assessment  Initial Evaluation      Patient Name: Ramirez Padilla  YOB: 1965  Diagnosis: Shortness of breath [R06.02]  Generalized abdominal pain [R10.84]  BRBPR (bright red blood per rectum) [H32.4]  Alcoholic cirrhosis of liver with ascites (HCC) [K70.31]  Fatigue, unspecified type [R53.83]  Date / Time: 6/20/2022  4:56 PM    Admission status/Date:  06/20/2022  Chart Reviewed: Yes      Patient Interviewed: Yes   Family Interviewed:  No      Hospitalization in the last 30 days:  No      Health Care Decision Maker :   Primary Decision Maker: Elizabeth Fam - Parent - [phone]    (CM - must 1st enter selection under Navigator - emergency contact- [place] Relationship and pick relationship)   Who do you trust or have selected to make healthcare decisions for you    Current PCP:     Financial  Medicare/[place]icaid [facility] [place])  Emily 1153 required for SNF : [facility]          3 night stay required - Y, N    ADLS  Support Systems/Care Needs: Friends/Neighbors  Transportation: self    Meal Preparation: self    Housing  Living Arrangements: From [facility] [place] [facility] (Lives in [place])  Steps: NA  Intent for return to present living arrangements: Yes  Identified Issues:     Home O2 Use :  No      Community Service Affiliation  Dialysis:  No    · Agency:  · Location:  · Dialysis Schedule:  · Phone:   · Fax: Other Community Services: (ex:PT/OT,Mental Health,Wound Clinic, Cardio/Pul [address])    DISCHARGE PLAN: Explained Case Management role/services. Chart reviewed. Met with pt at bedside and explained the role of the CM. Plans to return to [facility]. He lives in [place] and was Peter. Call Jimmie Brandt at [phone] upon DC.  +CM following

## 2022-06-21 NOTE — FLOWSHEET NOTE
06/21/22 7215   Pain Assessment   Pain Assessment 0-10   Pain Level 7   Patient's Stated Pain Goal 0 - No pain   Pain Location Abdomen;Leg;Hand   Pain Orientation Mid;Right;Left   Pain Descriptors Cramping   Functional Pain Assessment Activities are not prevented   Pain Type Chronic pain   Pain Frequency Continuous   Pain Onset On-going   Non-Pharmaceutical Pain Intervention(s) Rest   Opioid-Induced Sedation   POSS Score 1   pt C/O pain and N/V gave norco and Zofran per PRN orders

## 2022-06-21 NOTE — PROGRESS NOTES
pt is asking for puffer . (RT is not seeing pt)  tried to verified pt med's was unable pt reports has he takes what ever we gave him last time. I called our out pt pharm and they show no record on him. in pt pharm is questing pt lexapro orders and pt has orders for baraciude, and viread that we do not carry and pt reports is unable to have anyone bring in. Perfect serve sent to MD. Orders received albuterol 2 puff QID PRN.

## 2022-06-21 NOTE — H&P
Hospital Medicine History & Physical      PCP: None Provider    Date of Service: Pt seen/examined on 6/21/22 and admitted on 6/21/22 to Inpatient    Chief Complaint   Patient presents with    Abdominal Pain     Pt c/o abd pain x 4 days, states hes unable to have a BM. History Of Present Illness: The patient is a 49 y.o. male with PMH below, presents with BRBPR, abd distension pain, early satiety, decreased appetitie. Pt reports he has Hx of cirrhosis (Hep C currently under Tx, ETOH in remission). Cirrhosis was Dx about 1.5 yrs ago. Pt reports that he last underwent para ~ 2 wks ago and 2L was removed. He says he feels like he needs drained again. He reports that his abd has been uncomfortable but is c/w previous when he needed para. His belly feels tight and under pressure. He has had 2 episodes of BRBPR over the last 24h. He denies Hx of GI bleeding in the past.  Of note he was recently DC from this facility on 6/18 after being treated for PNA and ETOH detox. He has 2d left on his levofloxacin. He [facility] to [facility] from [facility]TOH rehab facility. Past Medical History:        Diagnosis Date    Cirrhosis ([facility] [address]     Hepatitis C        Past Surgical History:        Procedure Laterality Date    APPENDECTOMY      CHOLECYSTECTOMY      LEG AMPUTATION BELOW KNEE Right     TONSILLECTOMY         Medications Prior to Admission:    Prior to Admission medications    Medication Sig Start Date End Date Taking?  Authorizing Provider   albuterol sulfate HFA (PROVENTIL;VENTOLIN;PROAIR) 108 (90 Base) MCG/ACT inhaler Inhale 2 puffs into the lungs every 6 hours as needed for Wheezing or Shortness of Breath 6/18/22   Lavon Mcneill MD   furosemide (LASIX) 40 MG tablet Take 1 tablet by mouth daily 6/18/22   Lavon Mcneill MD   lactulose (CHRONULAC) 10 GM/15ML solution Take 30 mLs by mouth 2 times daily 6/18/22   Lavon Mcneill MD   levoFLOXacin (LEVAQUIN) 500 MG tablet Take 1 tablet by mouth daily for 5 days 6/18/22 6/23/22  Meghan Ochoa MD   omeprazole (PRILOSEC) 20 MG delayed release capsule Take 20 mg by mouth daily    Historical Provider, MD   tenofovir disoproxil fumarate (VIREAD) 300 MG tablet Take 300 mg by mouth daily    Historical Provider, MD   entecavir (BARACLUDE) 0.5 MG tablet Take 1 mg by mouth daily    Historical Provider, MD   folic acid (FOLVITE) 1 MG tablet Take 1 mg by mouth daily    Historical Provider, MD   spironolactone (ALDACTONE) 50 MG tablet Take 50 mg by mouth daily    Historical Provider, MD   Thiamine HCl (B-1) 100 MG TABS Take by mouth    Historical Provider, MD   polyethylene glycol (MIRALAX) 17 g PACK packet Take 17 g by mouth daily    Historical Provider, MD   Alum & Mag Hydroxide-Simeth (MINTOX EXTRA STRENGTH PO) Take by mouth    Historical Provider, MD   hydrOXYzine pamoate (VISTARIL) 50 MG capsule Take 50 mg by mouth 3 times daily as needed for Itching    Historical Provider, MD   magnesium hydroxide (MILK OF MAGNESIA) 400 MG/5ML suspension Take by mouth daily as needed for Constipation    Historical Provider, MD   ondansetron (ZOFRAN) 4 MG tablet Take 4 mg by mouth every 8 hours as needed for Nausea or Vomiting    Historical Provider, MD   docusate sodium (COLACE) 100 MG capsule Take 100 mg by mouth 2 times daily    Historical Provider, MD       Allergies:  Patient has no known allergies. Social History:    TOBACCO:   reports that he has been smoking. He has been smoking about 1.00 pack per day. He has never used smokeless tobacco.  ETOH:   reports previous alcohol use. Family History:  Reviewed in detail and negative for DM, Early CAD, Cancer (except as below). Positive as follows:    History reviewed. No pertinent family history.     REVIEW OF SYSTEMS:   Pertinent positives/negatives as follows: BRBPR, abd distension pain, early satiety, decreased appetitie, and as discussed in HPI, otherwise a complete ROS performed and all other systems are negative  PHYSICAL EXAM PERFORMED:    BP 95/68   Pulse 76   Temp 98.8 °F (37.1 °C)   Resp 16   Ht 5' 10\" (1.778 m)   Wt 197 lb (89.4 kg)   SpO2 93%   BMI 28.27 kg/m²     GEN:  A&Ox3, NAD. HEENT:  NC/AT,EOMI, semi dry MM, no erythema/exudates or visible masses. CVS:  Normal S1,S2. RRR. Without M/G/R.   LUNG:   CTA-B. no wheezes, rales or rhonchi  ABD:  Soft, taught, distension, + fluid wave. Mild diffuse ttp. BS+ x4 but diminished. Without G/R.  EXT: 2+ pulses, no c/c/e. 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 Brisk cap refill. PSY:  Thought process intact, affect appropriate. CANDE:  CN III-XII intact, moves all 4 spontaneously, sensory grossly intact. SKIN: No rash or lesions on visible skin. Chart review shows recent radiographs:  CT ABDOMEN PELVIS W IV CONTRAST Additional Contrast? None    Result Date: 6/20/2022  EXAMINATION: CT OF THE ABDOMEN AND PELVIS WITH CONTRAST 6/20/2022 6:26 pm TECHNIQUE: CT of the abdomen and pelvis was performed with the administration of 75 mL Isovue 370 intravenous contrast. Multiplanar reformatted images are provided for review. Automated exposure control, iterative reconstruction, and/or weight based adjustment of the mA/kV was utilized to reduce the radiation dose to as low as reasonably achievable. COMPARISON: 06/16/2022 HISTORY: Abdominal pain and swelling with nausea/vomiting. Cirrhosis. FINDINGS: Motion artifact limits evaluation of the pelvis. Lower Chest: Bibasilar atelectasis/scarring. Organs: Hepatomegaly with suspected mild steatosis. Spleen is enlarged, measuring 16.8 cm in craniocaudal dimension. Cholecystectomy. Small renal cysts; no follow-up imaging recommended. Remaining solid organs are unremarkable. GI/Bowel: Stable small hiatal hernia. Stomach and small bowel are unremarkable. The appendix has been removed. Sigmoid diverticulosis without definite acute inflammation. Pelvis: Bladder and prostate are unremarkable.   No lymphadenopathy or free fluid. Peritoneum/Retroperitoneum: No ascites or significant lymphadenopathy. Atherosclerotic disease throughout the abdominal aorta without aneurysm. Bones/Soft Tissues: Unremarkable. No acute abnormality. Hepatomegaly with suspected mild steatosis. Splenomegaly could be due to portal hypertension. Sigmoid diverticulosis without definite acute diverticulitis. CT ABDOMEN PELVIS W IV CONTRAST Additional Contrast? None    Result Date: 6/16/2022  EXAMINATION: CT OF THE ABDOMEN AND PELVIS WITH CONTRAST, 6/16/2022 6:16 pm TECHNIQUE: CT of the abdomen and pelvis was performed with the administration of intravenous contrast. Multiplanar reformatted images are provided for review. Automated exposure control, iterative reconstruction, and/or weight based adjustment of the mA/kV was utilized to reduce the radiation dose to as low as reasonably achievable. COMPARISON: None HISTORY: ORDERING SYSTEM PROVIDED HISTORY:  Of her cirrhosis with possible ascites. TECHNOLOGIST PROVIDED HISTORY: Additional Contrast?  None Reason for Exam:  Of her cirrhosis with possible ascites. Decision Support Exception - unselect if not a suspected or confirmed emergency medical condition->Emergency Medical Condition (MA) Reason for Exam:  Abd pain FINDINGS: Lower Chest: Emphysema. Infiltrate in the region of the lingula consistent with pneumonia. Mild atelectatic changes. Organs: The liver demonstrates diffuse fatty infiltration but no focal disease. Status post cholecystectomy. Pancreas and adrenals, aorta and IVC appear stable. Mild left nephrolithiasis but no obstructive uropathy. Aorta is calcified but nonaneurysmal.  Splenomegaly with the spleen measuring 17 cm. GI/Bowel: No evidence of bowel obstruction or perforation. Diverticulosis mostly in the sigmoid but no evidence of acute diverticulitis. Pelvis: Urinary bladder, prostate and seminal vesicles appear unremarkable. No evidence of inguinal hernia or lymphadenopathy. Peritoneum/Retroperitoneum: No evidence of retroperitoneal lymphadenopathy or acute mesenteric findings. Bones/Soft Tissues: No acute abnormality. 1. Lingular infiltrate consistent with possible pneumonia. 2. Fatty infiltration but no focal disease. Splenomegaly. 3. Extensive diverticulosis worse in the sigmoid but no acute diverticulitis. No evidence of bowel obstruction. XR CHEST PORTABLE    Result Date: 6/20/2022  EXAMINATION: ONE XRAY VIEW OF THE CHEST 6/20/2022 6:05 pm COMPARISON: June 16, 2022 HISTORY: ORDERING SYSTEM PROVIDED HISTORY: shortness of breath TECHNOLOGIST PROVIDED HISTORY: Reason for exam:->shortness of breath Reason for Exam: SOB FINDINGS: The lungs are without acute focal process. There is no effusion or pneumothorax. The cardiomediastinal silhouette is without acute process. The osseous structures are without acute process. No acute process. XR CHEST PORTABLE    Result Date: 6/16/2022  EXAMINATION: ONE XRAY VIEW OF THE CHEST 6/16/2022 6:14 pm COMPARISON: None. HISTORY: ORDERING SYSTEM PROVIDED HISTORY: PAIN TECHNOLOGIST PROVIDED HISTORY: Reason for exam:->PAIN Reason for Exam: abd pain FINDINGS: The cardiomediastinal silhouette is unremarkable. The lungs are clear. No infiltrate, pleural fluid or focal process is identified. Osseous structures are unremarkable. No acute cardiopulmonary disease.        EKG:    EKG 12 Lead [[id number]]    Collected: 06/20/22 1727    Updated: 06/20/22 1739     Ventricular Rate 79 BPM    Atrial Rate 79 BPM    P-R Interval 170 ms    QRS Duration 86 ms    Q-T Interval 396 ms    QTc Calculation (Bazett) 454 ms    P Axis 37 degrees    R Axis -49 degrees    T Axis 45 degrees    Diagnosis Normal sinus rhythmLeft anterior fascicular blockAbnormal ECGWhen compared with ECG of 16-JUN-2022 18:06,No significant change was found     CBC:  Recent Labs     06/18/22  0516 06/20/22  1743   WBC 5.7 7.2   HGB 13.2* 13.7   HCT 39.8* 39.7*    232 RENAL  Recent Labs     06/18/22  0516 06/20/22  1743    137   K 3.8 4.4    104   CO2 23 21   BUN 10 10   CREATININE 1.2 1.1   GLUCOSE 109* 116*       Hemoglobin a1c:  No results found for: LABA1C    LFT'S:  Recent Labs     06/18/22  0516 06/20/22  1743   * 115*   ALT 27 24   BILITOT 1.5* 1.1*   ALKPHOS 128 120       COAG:  Recent Labs     06/20/22  1743   INR 1.14       CARDIAC ENZYMES:   Recent Labs     06/20/22  1743   TROPONINI <0.01     Lab Results   Component Value Date    PROBNP 142 (H) 06/20/2022       LACTIC ACID:  Recent Labs     06/20/22  1743   LACTSEPSIS 1.9       U/A:  Recent Labs     06/20/22  1728   LEUKOCYTESUR Negative   COLORU Yellow   CLARITYU Clear   SPECGRAV 1.010   BLOODU Negative   GLUCOSEU Negative     PHYSICIAN CERTIFICATION  I certify that [employer] is expected to be hospitalized for 2 midnights based on the following assessment and plan:    ASSESSMENT/PLAN:  BRBPR, hemoccult +, HGB currently 13.7, last was 13.2 on 6/18/22. Will plan to transfuse for HGB < 7.0. Hx of cirrhosis. H&H q6h, GI c/s. Admit to 2w tele. Abd pain, likely 2/2 distension needing para. IR c/s for para. Elevated lipase but exam not c/w pancreatitis. Recent admit for PNA/ETOH, cont home levofloxacin x 3 days. Cirrhosis, decompensated, cont home regimen. Cont home antivirals for hep C. Hx ETOH abuse in remission. Cont home regimen. GERD, cont PPI. Tobacco Abuse, counseled cessation, 21 mg nicotine patch. DVT Prophylaxis: SCD  Diet: NPO  Code Status: Full Code   PT/OT Eval Status: Will order if needed and as patient condition allows  Dispo - Admit to inpatient     Abbe MD Kayy    Thank you None Provider for the opportunity to be involved in this patient's care. If you have any questions or concerns please feel free to contact me via the [employer] Answering Service at [phone]. This chart was generated using the [address] [place] [address] [address]  [place] system.  I created this record but it may contain dictation errors given the limitations of this technology.

## 2022-06-21 NOTE — PROGRESS NOTES
RT Inhaler-Nebulizer Bronchodilator Protocol Note    There is a bronchodilator order in the chart from a provider indicating to follow the RT Bronchodilator Protocol and there is an Initiate RT Inhaler-Nebulizer Bronchodilator Protocol order as well (see protocol at bottom of note). CXR Findings:  XR CHEST PORTABLE    Result Date: 6/20/2022  No acute process. The findings from the last RT Protocol Assessment were as follows:   History Pulmonary Disease: (P) Smoker 15 pack years or more  Respiratory Pattern: (P) Regular pattern and RR 12-20 bpm  Breath Sounds: (P) Slightly diminished and/or crackles  Cough: (P) Strong, spontaneous, non-productive  Indication for Bronchodilator Therapy: (P) On home bronchodilators  Bronchodilator Assessment Score: (P) 3    Aerosolized bronchodilator medication orders have been revised according to the RT Inhaler-Nebulizer Bronchodilator Protocol below. Respiratory Therapist to perform RT Therapy Protocol Assessment initially then follow the protocol. Repeat RT Therapy Protocol Assessment PRN for score 0-3 or on second treatment, BID, and PRN for scores above 3. No Indications - adjust the frequency to every 6 hours PRN wheezing or bronchospasm, if no treatments needed after 48 hours then discontinue using Per Protocol order mode. If indication present, adjust the RT bronchodilator orders based on the Bronchodilator Assessment Score as indicated below. Use Inhaler orders unless patient has one or more of the following: on home nebulizer, not able to hold breath for 10 seconds, is not alert and oriented, cannot activate and use MDI correctly, or respiratory rate 25 breaths per minute or more, then use the equivalent nebulizer order(s) with same Frequency and PRN reasons based on the score. If a patient is on this medication at home then do not decrease Frequency below that used at home.     0-3 - enter or revise RT bronchodilator order(s) to equivalent RT Bronchodilator order with Frequency of every 4 hours PRN for wheezing or increased work of breathing using Per Protocol order mode. 4-6 - enter or revise RT Bronchodilator order(s) to two equivalent RT bronchodilator orders with one order with BID Frequency and one order with Frequency of every 4 hours PRN wheezing or increased work of breathing using Per Protocol order mode. 7-10 - enter or revise RT Bronchodilator order(s) to two equivalent RT bronchodilator orders with one order with TID Frequency and one order with Frequency of every 4 hours PRN wheezing or increased work of breathing using Per Protocol order mode. 11-13 - enter or revise RT Bronchodilator order(s) to one equivalent RT bronchodilator order with QID Frequency and an Albuterol order with Frequency of every 4 hours PRN wheezing or increased work of breathing using Per Protocol order mode. Greater than 13 - enter or revise RT Bronchodilator order(s) to one equivalent RT bronchodilator order with every 4 hours Frequency and an Albuterol order with Frequency of every 2 hours PRN wheezing or increased work of breathing using Per Protocol order mode.          Electronically signed by Yumiko Ng RCP on 6/21/2022 at 3:43 PM

## 2022-06-21 NOTE — PROGRESS NOTES
Shift assessment complete. Scheduled meds given. See MAR. Patients head-toe complete, VS are logged The bed is locked and is in the lowest position. Call light and bedside table are within reach.

## 2022-06-21 NOTE — PROGRESS NOTES
Consult called to Dr. Stephanie Madrid at this time due to Dr. Stephanie Madrid being consulted last stay.

## 2022-06-21 NOTE — FLOWSHEET NOTE
06/21/22 1808   Vitals   Resp 20   Pain Assessment   Pain Assessment 0-10   Pain Level 8   Patient's Stated Pain Goal 0 - No pain   Pain Location Abdomen;Hand;Leg   Pain Orientation Right;Left;Mid   Pain Descriptors Aching;Cramping   Functional Pain Assessment Activities are not prevented   Pain Type Chronic pain;Acute pain   Pain Frequency Continuous   Pain Onset On-going   Non-Pharmaceutical Pain Intervention(s) Repositioned   Opioid-Induced Sedation   POSS Score 1

## 2022-06-21 NOTE — ED NOTES
Unable to complete medication rec with patient, pt states \"I don't know what I take really\". Pt has meds filled in [facility].       Wong Miller RN  06/20/22 5247

## 2022-06-21 NOTE — ANESTHESIA PRE PROCEDURE
Department of Anesthesiology  Preprocedure Note       Name:  Maddie Gates   Age:  49 y.o.  :  1965                                          MRN:  [id number]         Date:  2022      Surgeon: Valentine Rehman):  Malia Barboza DO    Procedure: Procedure(s):  EGD W/ANES. COLON W/ANES. Medications prior to admission:   Prior to Admission medications    Medication Sig Start Date End Date Taking?  Authorizing Provider   escitalopram (LEXAPRO) 5 MG tablet Take 2.5 mg by mouth 4 times daily   Yes Historical Provider, MD   doxepin (SINEQUAN) 10 MG capsule Take 10 mg by mouth nightly   Yes Historical Provider, MD   lactulose encephalopathy (GENERLAC) 10 GM/15ML SOLN solution Take 30 g by mouth 2 times daily   Yes Historical Provider, MD   propranolol (INDERAL) 10 MG tablet Take 10 mg by mouth 2 times daily   Yes Historical Provider, MD   entecavir (BARACLUDE) 1 MG tablet  22   Historical Provider, MD   omeprazole (PRILOSEC) 40 MG delayed release capsule TAKE 1 CAPSULE 30 MINUTES BEFORE MORNING MEAL ORALLY TWICE A DAY 30 DAY(S) 22   Historical Provider, MD   furosemide (LASIX) 40 MG tablet Take 1 tablet by mouth daily 22   Jorge Luis Cox MD   levoFLOXacin (LEVAQUIN) 500 MG tablet Take 1 tablet by mouth daily for 5 days 22  Jorge Luis Cox MD   omeprazole (PRILOSEC) 20 MG delayed release capsule Take 20 mg by mouth daily    Historical Provider, MD   tenofovir disoproxil fumarate (VIREAD) 300 MG tablet Take 300 mg by mouth daily    Historical Provider, MD   folic acid (FOLVITE) 1 MG tablet Take 1 mg by mouth daily    Historical Provider, MD   spironolactone (ALDACTONE) 50 MG tablet Take 50 mg by mouth daily    Historical Provider, MD   Thiamine HCl (B-1) 100 MG TABS Take by mouth    Historical Provider, MD       Current medications:    Current Facility-Administered Medications   Medication Dose Route Frequency Provider Last Rate Last Admin    sodium chloride flush 0.9 % injection 5-40 mL  5-40 mL IntraVENous 2 times per day Talon Lutz MD        sodium chloride flush 0.9 % injection 5-40 mL  5-40 mL IntraVENous PRN Talon Lutz MD        0.9 % sodium chloride infusion   IntraVENous PRN Talon Lutz MD        ondansetron (ZOFRAN-ODT) disintegrating tablet 4 mg  4 mg Oral Q8H PRN Talon Lutz MD        Or    ondansetron [facility]) injection 4 mg  4 mg IntraVENous Q6H PRN Talon Lutz MD   4 mg at 06/21/22 1334    acetaminophen (TYLENOL) tablet 650 mg  650 mg Oral Q6H PRN Talon Lutz MD        Or    acetaminophen (TYLENOL) suppository 650 mg  650 mg Rectal Q6H PRN Talon Lutz MD        0.9 % sodium chloride infusion   IntraVENous Continuous Talon Lutz MD 50 mL/hr at 06/21/22 1109 New Bag at 06/21/22 1109    HYDROmorphone (DILAUDID) injection 0.5 mg  0.5 mg IntraVENous Q3H PRN Talon Lutz MD   0.5 mg at 06/21/22 1808    doxepin (SINEQUAN) capsule 10 mg  10 mg Oral Nightly Talon Lutz MD        entecavir [facility]) tablet 1 mg  1 mg Oral Daily Talon Lutz MD        escitalopram (LEXAPRO) tablet 2.5 mg  2.5 mg Oral 4x Daily Talon Lutz MD        folic acid (FOLVITE) tablet 1 mg  1 mg Oral Daily Talon Lutz MD   1 mg at 06/21/22 0843    furosemide (LASIX) tablet 40 mg  40 mg Oral Daily Talon Lutz MD   40 mg at 06/21/22 0843    lactulose (CHRONULAC) 10 GM/15ML solution 30 g  30 g Oral BID Talon Lutz MD   30 g at 06/21/22 0841    levoFLOXacin (LEVAQUIN) tablet 500 mg  500 mg Oral Daily Talon Lutz MD   500 mg at 06/21/22 0843    pantoprazole (PROTONIX) tablet 40 mg  40 mg Oral QAM AC Talon Lutz MD   40 mg at 06/21/22 0543    propranolol (INDERAL) tablet 10 mg  10 mg Oral BID Talon Lutz MD   10 mg at 06/21/22 0844    spironolactone (ALDACTONE) tablet 50 mg  50 mg Oral Daily Talon Lutz MD   50 mg at 06/21/22 0844    tenofovir disoproxil fumarate [employer]) tablet 300 mg  300 mg Oral Daily Violet Vieira MD        thiamine tablet 100 mg  100 mg Oral Daily Violet Vieira MD   100 mg at 06/21/22 0844    nicotine (NICODERM CQ) 21 MG/24HR 1 patch  1 patch TransDERmal Daily Violet Vieira MD        HYDROcodone-acetaminophen [facility]) 5-325 MG per tablet 1 tablet  1 tablet Oral Q6H PRN Mary Catalan MD   1 tablet at 06/21/22 1335    albuterol sulfate HFA (PROVENTIL;VENTOLIN;PROAIR) 108 (90 Base) MCG/ACT inhaler 2 puff  2 puff Inhalation TID Rishi Henderson MD   2 puff at 06/21/22 1906       Allergies:  No Known Allergies    Problem List:    Patient Active Problem List   Diagnosis Code    Generalized abdominal pain R10.84    Other cirrhosis of liver ([facility] K74.69    Alcohol abuse F10.10    Pneumonia due to infectious organism J18.9    BRBPR (bright red blood per rectum) K62.5       Past Medical History:        Diagnosis Date    Cirrhosis ([facility]     Hepatitis C        Past Surgical History:        Procedure Laterality Date    APPENDECTOMY      CHOLECYSTECTOMY      LEG AMPUTATION BELOW KNEE Right     TONSILLECTOMY         Social History:    Social History     Tobacco Use    Smoking status: Current Every Day Smoker     Packs/day: 1.00    Smokeless tobacco: Never Used   Substance Use Topics    Alcohol use: Not Currently                                Ready to quit: Not Answered  Counseling given: Not Answered      Vital Signs (Current):   Vitals:    06/21/22 1459 06/21/22 1541 06/21/22 1808 06/21/22 1908   BP:       Pulse:       Resp: 20  20    Temp:       TempSrc:       SpO2:  94%  95%   Weight:       Height:                                                  BP Readings from Last 3 Encounters:   06/21/22 109/78   06/18/22 123/81       NPO Status:                                                                                 BMI:   Wt Readings from Last 3 Encounters:   06/21/22 195 lb (88.5 kg)   06/18/22 199 lb 6.4 oz (90.4 kg)     Body mass index is 27.98 kg/m². CBC:   Lab Results   Component Value Date    WBC 6.5 06/21/2022    RBC 3.61 06/21/2022    HGB 14.3 06/21/2022    HCT 42.3 06/21/2022    .2 06/21/2022    RDW 16.1 06/21/2022     06/21/2022       CMP:   Lab Results   Component Value Date     06/21/2022    K 3.9 06/21/2022     06/21/2022    CO2 21 06/21/2022    BUN 10 06/21/2022    CREATININE 1.1 06/21/2022    GFRAA >60 06/21/2022    AGRATIO 0.7 06/21/2022    LABGLOM >60 06/21/2022    GLUCOSE 98 06/21/2022    PROT 7.6 06/21/2022    CALCIUM 9.4 06/21/2022    BILITOT 1.3 06/21/2022    ALKPHOS 120 06/21/2022     06/21/2022    ALT 24 06/21/2022       POC Tests: No results for input(s): POCGLU, POCNA, POCK, POCCL, POCBUN, POCHEMO, POCHCT in the last 72 hours.     Coags:   Lab Results   Component Value Date    PROTIME 14.6 06/20/2022    INR 1.14 06/20/2022       HCG (If Applicable): No results found for: PREGTESTUR, PREGSERUM, HCG, HCGQUANT     ABGs: No results found for: PHART, PO2ART, ZNA9LVN, MFA5EVG, BEART, R7EYFDUY     Type & Screen (If Applicable):  No results found for: LABABO, LABRH    Drug/Infectious Status (If Applicable):  No results found for: HIV, HEPCAB    COVID-19 Screening (If Applicable):   Lab Results   Component Value Date    COVID19 Not Detected 06/20/2022           Anesthesia Evaluation  Patient summary reviewed and Nursing notes reviewed no history of anesthetic complications:   Airway: Mallampati: II  TM distance: >3 FB   Neck ROM: full  Mouth opening: > = 3 FB   Dental:    (+) lower dentures and edentulous      Pulmonary:   (+) pneumonia:  current smoker                           Cardiovascular:Negative CV ROS                      Neuro/Psych:   (+) psychiatric history (ETOH abuse):            GI/Hepatic/Renal:   (+) hepatitis: C, liver disease (cirrhosis):,      (-) GERD and no renal disease       Endo/Other: Negative Endo/Other ROS       (-) diabetes mellitus               Abdominal: Vascular: negative vascular ROS. Other Findings:           Anesthesia Plan      MAC     ASA 3       Induction: intravenous. Anesthetic plan and risks discussed with patient. Plan discussed with CRNA.                     Jeniffer Harris MD   6/21/2022

## 2022-06-21 NOTE — PLAN OF CARE
Problem: Discharge Planning  Goal: Discharge to home or other facility with appropriate resources  6/21/2022 1359 by Lex Adames RN  Outcome: Progressing  6/21/2022 0313 by Katelynn Phan RN  Outcome: Progressing  Flowsheets  Taken 6/21/2022 0259  Discharge to home or other facility with appropriate resources: Identify barriers to discharge with patient and caregiver  Taken 6/21/2022 0145  Discharge to home or other facility with appropriate resources: Identify barriers to discharge with patient and caregiver     Problem: Pain  Goal: Verbalizes/displays adequate comfort level or baseline comfort level  6/21/2022 1359 by Lex Adames RN  Outcome: Progressing     Problem: Skin/Tissue Integrity  Goal: Absence of new skin breakdown  Description: 1. Monitor for areas of redness and/or skin breakdown  2. Assess vascular access sites hourly  3. Every 4-6 hours minimum:  Change oxygen saturation probe site  4. Every 4-6 hours:  If on nasal continuous positive airway pressure, respiratory therapy assess nares and determine need for appliance change or resting period.   6/21/2022 1359 by Lex Adames RN  Outcome: Progressing     Problem: Safety - Adult  Goal: Free from fall injury  6/21/2022 1359 by Lex Adames RN  Outcome: Progressing  Flowsheets (Taken 6/21/2022 1356)  Free From Fall Injury: Instruct family/caregiver on patient safety

## 2022-06-21 NOTE — DISCHARGE SUMMARY
Name:  Rock Wise  Room:  0210/0210-01  MRN:    [id number]    Discharge Summary      This discharge summary is in conjunction with a complete physical exam done on the day of discharge. Discharging Physician: Otilia Hudson MD      Admit: 6/16/2022  Discharge:  6/18/2022    Diagnoses this Admission    Active Problems:    Generalized abdominal pain    Other cirrhosis of liver (HCC)    Alcohol abuse    Pneumonia due to infectious organism  Resolved Problems:    * No resolved hospital problems. *          Procedures (Please Review Full Report for Details)      Consults    IP CONSULT TO HOSPITALIST  IP CONSULT TO GI      HPI:    The patient is a 56 y.o. male with Cirrhosis due to hepatitis C, h/o alcohol abuse who presents to [facility] with abdominal pain. Recently hospitalized in [facility] for ascites and had multiple liters drained. He states this was a week ago. He states abdominal pain started 6-7 days ago. Some fluid has built back up. He states the pain is diffuse and has continued to worsen. Associated symptoms are nausea. Denies fevers, chills, vomiting, diarrhea, cough, chest pain. He feels short of breath.       Vitals stable. Labs with elevated AST, Lipase 90. CXR negative. CT abdomen/pelvis with lingular infiltrate consistent with possible pneumonia, fatty infiltration, splenomegaly, extensive diverticulosis worse in the sigmoid, no acute diverticulitis or evidence of bowel obstruction. Admitted to med-surg. GI consulted. Physical Exam at Discharge:  /81   Pulse 96   Temp 97.8 °F (36.6 °C) (Oral)   Resp 22   Ht 5' 10\" (1.778 m)   Wt 199 lb 6.4 oz (90.4 kg)   SpO2 92%   BMI 28.61 kg/m²       Gen: No distress. Alert. Eyes: PERRL. No sclera icterus. No conjunctival injection. ENT: No discharge. Pharynx clear. Neck:Trachea midline. Resp: No accessory muscle use. No crackles. No wheezes. No rhonchi. CV: Regular rate. Regular rhythm. No murmur.   No rub. No edema. GI: + tender all quadrants.+ distended/ascites. Normal bowel sounds. No hernia. Skin: Warm and dry. No nodule on exposed extremities. No rash on exposed extremities. M/S: No cyanosis. No joint deformity. No clubbing. Neuro: Awake. Grossly nonfocal    Psych: Oriented x 3. No anxiety or agitation. Hospital Course    Abdominal pain  Possibly SBP  -admitted to med-surg. GI consulted. -Rocephin D#1  -Oxy-IR prn  Bedside ultrasound-guided paracentesis attempted in the ER. Not successful. Also CT of the abdomen pelvis does not mention presence of ascites currently.     Hepatitis C  Cirrhosis  Elevated LFT's  -GI consult  -monitor labs  -resumed home medications.     Lingular infiltrate  Possible pneumonia  Patient denies any cough however admits to having shortness of breath. Chronic smoker  Continue Rocephin. add azithromycin     H/o alcohol abuse  - no longer drinks alcohol   -continue Thiamine, Folic acid    GERD  -on PPI     Tobacco Dependence  -Recommended cessation        CT ABDOMEN PELVIS W IV CONTRAST Additional Contrast? None   Final Result   1. Lingular infiltrate consistent with possible pneumonia. 2. Fatty infiltration but no focal disease. Splenomegaly. 3. Extensive diverticulosis worse in the sigmoid but no acute diverticulitis. No evidence of bowel obstruction. XR CHEST PORTABLE   Final Result   No acute cardiopulmonary disease.                 Discharge Medications     Medication List      START taking these medications    levoFLOXacin 500 MG tablet  Commonly known as: LEVAQUIN  Take 1 tablet by mouth daily for 5 days  Notes to patient: Levaquin  Use: Antibiotic  Side effects: Nausea/Vomiting, Headache,   Dizziness, Tendoniitis, Tendon rupture        CHANGE how you take these medications    furosemide 40 MG tablet  Commonly known as: LASIX  Take 1 tablet by mouth daily  What changed:   · medication strength  · how much to take  · when to take this CONTINUE taking these medications    B-1 173 MG Tabs     folic acid 1 MG tablet  Commonly known as: FOLVITE     omeprazole 20 MG delayed release capsule  Commonly known as: PRILOSEC     spironolactone 50 MG tablet  Commonly known as: ALDACTONE     tenofovir disoproxil fumarate 300 MG tablet  Commonly known as: VIREAD        STOP taking these medications    ibuprofen 400 MG tablet  Commonly known as: ADVIL;MOTRIN     pantoprazole 40 MG tablet  Commonly known as: PROTONIX           Where to Get Your Medications      You can get these medications from any pharmacy    Bring a paper prescription for each of these medications  · furosemide 40 MG tablet  · levoFLOXacin 500 MG tablet           Discharge Condition/Location: Stable    Follow Up: Follow up with PCP.         Micky Vega MD 6/21/2022 10:52 AM

## 2022-06-21 NOTE — PLAN OF CARE
Problem: Discharge Planning  Goal: Discharge to home or other facility with appropriate resources  Outcome: Progressing  Flowsheets  Taken 6/21/2022 0259  Discharge to home or other facility with appropriate resources: Identify barriers to discharge with patient and caregiver  Taken 6/21/2022 0145  Discharge to home or other facility with appropriate resources: Identify barriers to discharge with patient and caregiver     Problem: Pain  Goal: Verbalizes/displays adequate comfort level or baseline comfort level  Outcome: Progressing     Problem: Skin/Tissue Integrity  Goal: Absence of new skin breakdown  Description: 1. Monitor for areas of redness and/or skin breakdown  2. Assess vascular access sites hourly  3. Every 4-6 hours minimum:  Change oxygen saturation probe site  4. Every 4-6 hours:  If on nasal continuous positive airway pressure, respiratory therapy assess nares and determine need for appliance change or resting period.   Outcome: Progressing     Problem: Safety - Adult  Goal: Free from fall injury  Outcome: Progressing

## 2022-06-21 NOTE — FLOWSHEET NOTE
Patient admitted to room 214 from [place]. Oriented to room and call light. Bed wheels locked. Call light within reach and use of call light explained to patient. No other needs identified at this time. Will continue to monitor. 4 Eyes Skin Assessment     The patient is being assess for   Admission    I agree that 2 RN's have performed a thorough Head to Toe Skin Assessment on the patient. ALL assessment sites listed below have been assessed. Areas assessed for pressure by both nurses:   [x]   Head, Face, and Ears   [x]   Shoulders, Back, and Chest, Abdomen  [x]   Arms, Elbows, and Hands   [x]   Coccyx, Sacrum, and Ischium  [x]   Legs, Feet, and Heels         Scattered bruising and minor abrasions. Skin Assessed Under all Medical Devices by both nurses:  N/A              All Mepilex Borders were peeled back and area peeked at by both nurses:  No: None  Please list where Mepilex Borders are located:  N/A             **SHARE this note so that the co-signing nurse is able to place an eSignature**    Co-signer eSignature: {Esignature:[id number]}    Does the Patient have Skin Breakdown related to pressure? No          Oskar Prevention initiated:  NA   Wound Care Orders initiated:  NA      [facility] nurse consulted for Pressure Injury (Stage 3,4, Unstageable, DTI, NWPT, Complex wounds)and New or Established Ostomies:  NA      Primary Nurse eSignature: Electronically signed by Denese Denver, RN on 6/21/22 at 3:08 AM EDT          Bedside Mobility Assessment Tool (BMAT):     Assessment Level 1- Sit and Shake    1. From a semi-reclined position, ask patient to sit up and rotate to a seated position at the side of the bed. Can use the bedrail. 2. Ask patient to reach out and grab your hand and shake making sure patient reaches across his/her midline. Pass- Patient is able to come to a seated position, maintain core strength. Maintains seated balance while reaching across midline.  Move on to Assessment Level 2. Assessment Level 2- Stretch and Point   1. With patient in seated position at the side of the bed, have patient place both feet on the floor (or stool) with knees no higher than hips. 2. Ask patient to stretch one leg and straighten the knee, then bend the ankle/flex and point the toes. If appropriate, repeat with the other leg. Pass- Patient is able to demonstrate appropriate quad strength on intended weight bearing limb(s). Move onto Assessment Level 3. Assessment Level 3- Stand   1. Ask patient to elevate off the bed or chair (seated to standing) using an assistive device (cane, bedrail). 2. Patient should be able to raise buttocks off be and hold for a count of five. May repeat once. Pass- Patient maintains standing stability for at least 5 seconds, proceed to assessment level 4. Assessment Level 4- Walk   1. Ask patient to march in place at bedside. 2. Then ask patient to advance step and return each foot. Some medical conditions may render a patient from stepping backwards, use your best clinical judgement. Pass- Patient demonstrates balance while shifting weight and ability to step, takes independent steps, does not use assistive device patient is MOBILITY LEVEL 4. Mobility Level- 4      Patient is able to demonstrate the ability to move from a reclining position to an upright position within the recliner.

## 2022-06-22 ENCOUNTER — ANESTHESIA (OUTPATIENT)
Dept: ENDOSCOPY | Age: 57
DRG: 377 | End: 2022-06-22
Payer: MEDICARE

## 2022-06-22 VITALS
OXYGEN SATURATION: 94 % | DIASTOLIC BLOOD PRESSURE: 69 MMHG | BODY MASS INDEX: 27.92 KG/M2 | HEART RATE: 74 BPM | TEMPERATURE: 97.1 F | HEIGHT: 70 IN | SYSTOLIC BLOOD PRESSURE: 139 MMHG | WEIGHT: 195 LBS | RESPIRATION RATE: 16 BRPM

## 2022-06-22 LAB
HCT VFR BLD CALC: 38.3 % (ref 40.5–52.5)
HEMOGLOBIN: 12.9 G/DL (ref 13.5–17.5)

## 2022-06-22 PROCEDURE — 88305 TISSUE EXAM BY PATHOLOGIST: CPT

## 2022-06-22 PROCEDURE — 6360000002 HC RX W HCPCS: Performed by: INTERNAL MEDICINE

## 2022-06-22 PROCEDURE — 3609012400 HC EGD TRANSORAL BIOPSY SINGLE/MULTIPLE: Performed by: INTERNAL MEDICINE

## 2022-06-22 PROCEDURE — 0DBN8ZZ EXCISION OF SIGMOID COLON, VIA NATURAL OR ARTIFICIAL OPENING ENDOSCOPIC: ICD-10-PCS | Performed by: INTERNAL MEDICINE

## 2022-06-22 PROCEDURE — 94761 N-INVAS EAR/PLS OXIMETRY MLT: CPT

## 2022-06-22 PROCEDURE — 2500000003 HC RX 250 WO HCPCS: Performed by: NURSE ANESTHETIST, CERTIFIED REGISTERED

## 2022-06-22 PROCEDURE — 6370000000 HC RX 637 (ALT 250 FOR IP): Performed by: INTERNAL MEDICINE

## 2022-06-22 PROCEDURE — 2580000003 HC RX 258: Performed by: INTERNAL MEDICINE

## 2022-06-22 PROCEDURE — 85014 HEMATOCRIT: CPT

## 2022-06-22 PROCEDURE — 3700000001 HC ADD 15 MINUTES (ANESTHESIA): Performed by: INTERNAL MEDICINE

## 2022-06-22 PROCEDURE — 7100000011 HC PHASE II RECOVERY - ADDTL 15 MIN: Performed by: INTERNAL MEDICINE

## 2022-06-22 PROCEDURE — 3609010300 HC COLONOSCOPY W/BIOPSY SINGLE/MULTIPLE: Performed by: INTERNAL MEDICINE

## 2022-06-22 PROCEDURE — 2700000000 HC OXYGEN THERAPY PER DAY

## 2022-06-22 PROCEDURE — 0DB78ZX EXCISION OF STOMACH, PYLORUS, VIA NATURAL OR ARTIFICIAL OPENING ENDOSCOPIC, DIAGNOSTIC: ICD-10-PCS | Performed by: INTERNAL MEDICINE

## 2022-06-22 PROCEDURE — 36415 COLL VENOUS BLD VENIPUNCTURE: CPT

## 2022-06-22 PROCEDURE — 3609010600 HC COLONOSCOPY POLYPECTOMY SNARE/COLD BIOPSY: Performed by: INTERNAL MEDICINE

## 2022-06-22 PROCEDURE — 2580000003 HC RX 258: Performed by: NURSE ANESTHETIST, CERTIFIED REGISTERED

## 2022-06-22 PROCEDURE — 7100000010 HC PHASE II RECOVERY - FIRST 15 MIN: Performed by: INTERNAL MEDICINE

## 2022-06-22 PROCEDURE — 94640 AIRWAY INHALATION TREATMENT: CPT

## 2022-06-22 PROCEDURE — 6360000002 HC RX W HCPCS: Performed by: NURSE ANESTHETIST, CERTIFIED REGISTERED

## 2022-06-22 PROCEDURE — 0DBM8ZZ EXCISION OF DESCENDING COLON, VIA NATURAL OR ARTIFICIAL OPENING ENDOSCOPIC: ICD-10-PCS | Performed by: INTERNAL MEDICINE

## 2022-06-22 PROCEDURE — 85018 HEMOGLOBIN: CPT

## 2022-06-22 PROCEDURE — 2709999900 HC NON-CHARGEABLE SUPPLY: Performed by: INTERNAL MEDICINE

## 2022-06-22 PROCEDURE — 3700000000 HC ANESTHESIA ATTENDED CARE: Performed by: INTERNAL MEDICINE

## 2022-06-22 RX ORDER — SODIUM CHLORIDE, SODIUM LACTATE, POTASSIUM CHLORIDE, CALCIUM CHLORIDE 600; 310; 30; 20 MG/100ML; MG/100ML; MG/100ML; MG/100ML
INJECTION, SOLUTION INTRAVENOUS CONTINUOUS PRN
Status: DISCONTINUED | OUTPATIENT
Start: 2022-06-22 | End: 2022-06-22 | Stop reason: SDUPTHER

## 2022-06-22 RX ORDER — HYDROCORTISONE ACETATE 25 MG/1
25 SUPPOSITORY RECTAL 2 TIMES DAILY
Qty: 12 SUPPOSITORY | Refills: 0 | Status: SHIPPED | OUTPATIENT
Start: 2022-06-22 | End: 2022-06-28

## 2022-06-22 RX ORDER — LIDOCAINE HYDROCHLORIDE 20 MG/ML
INJECTION, SOLUTION INFILTRATION; PERINEURAL PRN
Status: DISCONTINUED | OUTPATIENT
Start: 2022-06-22 | End: 2022-06-22 | Stop reason: SDUPTHER

## 2022-06-22 RX ORDER — HYDROCORTISONE ACETATE 25 MG/1
25 SUPPOSITORY RECTAL 2 TIMES DAILY
Status: DISCONTINUED | OUTPATIENT
Start: 2022-06-22 | End: 2022-06-22 | Stop reason: HOSPADM

## 2022-06-22 RX ORDER — PROPOFOL 10 MG/ML
INJECTION, EMULSION INTRAVENOUS PRN
Status: DISCONTINUED | OUTPATIENT
Start: 2022-06-22 | End: 2022-06-22 | Stop reason: SDUPTHER

## 2022-06-22 RX ORDER — HYDROCORTISONE ACETATE 25 MG/1
25 SUPPOSITORY RECTAL 2 TIMES DAILY
Status: DISCONTINUED | OUTPATIENT
Start: 2022-06-22 | End: 2022-06-22 | Stop reason: SDUPTHER

## 2022-06-22 RX ORDER — PANTOPRAZOLE SODIUM 40 MG/1
40 TABLET, DELAYED RELEASE ORAL
Status: DISCONTINUED | OUTPATIENT
Start: 2022-06-22 | End: 2022-06-22 | Stop reason: HOSPADM

## 2022-06-22 RX ADMIN — LIDOCAINE HYDROCHLORIDE 50 MG: 20 INJECTION, SOLUTION INFILTRATION; PERINEURAL at 12:25

## 2022-06-22 RX ADMIN — HYDROMORPHONE HYDROCHLORIDE 0.5 MG: 1 INJECTION, SOLUTION INTRAMUSCULAR; INTRAVENOUS; SUBCUTANEOUS at 00:45

## 2022-06-22 RX ADMIN — HYDROMORPHONE HYDROCHLORIDE 0.5 MG: 1 INJECTION, SOLUTION INTRAMUSCULAR; INTRAVENOUS; SUBCUTANEOUS at 03:48

## 2022-06-22 RX ADMIN — PROPOFOL 100 MG: 10 INJECTION, EMULSION INTRAVENOUS at 12:31

## 2022-06-22 RX ADMIN — PROPOFOL 200 MG: 10 INJECTION, EMULSION INTRAVENOUS at 13:01

## 2022-06-22 RX ADMIN — LACTULOSE 30 G: 10 SOLUTION ORAL at 14:58

## 2022-06-22 RX ADMIN — SODIUM CHLORIDE, POTASSIUM CHLORIDE, SODIUM LACTATE AND CALCIUM CHLORIDE: 600; 310; 30; 20 INJECTION, SOLUTION INTRAVENOUS at 12:08

## 2022-06-22 RX ADMIN — LEVOFLOXACIN 500 MG: 500 TABLET, FILM COATED ORAL at 14:58

## 2022-06-22 RX ADMIN — FOLIC ACID 1 MG: 1 TABLET ORAL at 14:58

## 2022-06-22 RX ADMIN — HYDROMORPHONE HYDROCHLORIDE 0.5 MG: 1 INJECTION, SOLUTION INTRAMUSCULAR; INTRAVENOUS; SUBCUTANEOUS at 06:56

## 2022-06-22 RX ADMIN — HYDROCODONE BITARTRATE AND ACETAMINOPHEN 1 TABLET: 5; 325 TABLET ORAL at 02:19

## 2022-06-22 RX ADMIN — HYDROCODONE BITARTRATE AND ACETAMINOPHEN 1 TABLET: 5; 325 TABLET ORAL at 10:09

## 2022-06-22 RX ADMIN — SODIUM CHLORIDE: 9 INJECTION, SOLUTION INTRAVENOUS at 03:12

## 2022-06-22 RX ADMIN — PROPOFOL 200 MG: 10 INJECTION, EMULSION INTRAVENOUS at 12:45

## 2022-06-22 RX ADMIN — PROPOFOL 200 MG: 10 INJECTION, EMULSION INTRAVENOUS at 12:25

## 2022-06-22 RX ADMIN — PROPRANOLOL HYDROCHLORIDE 10 MG: 10 TABLET ORAL at 14:58

## 2022-06-22 RX ADMIN — Medication 2 PUFF: at 08:25

## 2022-06-22 RX ADMIN — Medication 100 MG: at 14:58

## 2022-06-22 ASSESSMENT — PAIN DESCRIPTION - ORIENTATION: ORIENTATION: MID

## 2022-06-22 ASSESSMENT — PAIN SCALES - GENERAL
PAINLEVEL_OUTOF10: 8
PAINLEVEL_OUTOF10: 7
PAINLEVEL_OUTOF10: 0
PAINLEVEL_OUTOF10: 7
PAINLEVEL_OUTOF10: 0
PAINLEVEL_OUTOF10: 8
PAINLEVEL_OUTOF10: 2
PAINLEVEL_OUTOF10: 7

## 2022-06-22 ASSESSMENT — PAIN - FUNCTIONAL ASSESSMENT
PAIN_FUNCTIONAL_ASSESSMENT: ACTIVITIES ARE NOT PREVENTED
PAIN_FUNCTIONAL_ASSESSMENT: 0-10

## 2022-06-22 ASSESSMENT — PAIN SCALES - WONG BAKER: WONGBAKER_NUMERICALRESPONSE: 2

## 2022-06-22 ASSESSMENT — PAIN DESCRIPTION - LOCATION
LOCATION: ABDOMEN;BACK
LOCATION: ABDOMEN
LOCATION: ABDOMEN;BACK
LOCATION: ABDOMEN;BACK
LOCATION: ABDOMEN

## 2022-06-22 ASSESSMENT — PAIN DESCRIPTION - DESCRIPTORS
DESCRIPTORS: THROBBING
DESCRIPTORS: ACHING

## 2022-06-22 NOTE — PROCEDURES
EGD/Colonoscopy Procedure Note    Patient: Hira Goff MRN: [id number]     YOB: 1965  Age: 49 y.o. Sex: male    Unit: [facility] Room/Bed: ENDO/NONE Location: [address]     Admitting Physician: Bryce Cerda       Primary Care Physician: None Provider      HISTORY:  The patient is a 49 y.o. male with history of rectal bleed  Past Medical History:   Diagnosis Date    Cirrhosis ([facility] [place] [address]     Hepatitis C         Indication:  Cirrhosis, rectal bleed    DATE OF OPERATION: 6/22/22    OPERATIVE SURGEON: Rodri Shearer DO    ASA: 3  Sedation: MAC       Procedure Details:    Appropriate informed consent was obtained. With the patient in left lateral position, the endoscope was passed through the hypopharynx into the esophagus, stomach, and as far as the proximal 3rd portion of the duodenum. Retroflexion was performed in the stomach to view the cardia and fundus. There was noted to be small to medium sized esophageal varices in the distal esophagus. There is also noted to be a 3 cm ulceration in the distal esophagus. There is a 3 cm hiatal hernia. The mucosa of the fundus had a tigroid appearance consistent with portal gastropathy. Estimated Blood Loss: minimal to none  Gastric or Duodenal ulcer present: No      Procedure Details:      Prep Quality: Inadequate  Cecum Intubated: Yes  With the patient in left lateral decubitus position the endoscope was inserted through the anorectal area into the rectum, through the colon  to the Cecum. Water was insufflated through the biopsy channel to clean out the colon and look at the underlying mucosa. The scope was gently withdrawn and the mucosa carefully examined. Retroflexion was performed in the rectum. There is notably multiple polyps in the colon these were removed with cold snare and the right side of the colon these were all sessile.   In the descending colon and sigmoid colon there is noted multiple polyps the largest ones were pedunculated in the sigmoid colon these were removed with hot snare the remainder removed with cold snare the visualization was limited secondary to solid debris the obstructing the view and clogging the scope. There was definitely some smaller polyps left behind. Also noted in the cecum was what appeared to be an inverted appendiceal orifice this was biopsied with cold biopsy forceps to rule out polyp. In the transverse colon there is noted to be 5 to 6 mm AVMs that were not bleeding. Sigmoid colon had diverticulosis. In the retroflexed view in the rectum showed internal hemorrhoids. .  Digital rectal exam was normal.    Complications:  none  Estimated Blood Loss: minimal to none     Findings:   EGD: small to Medium Esophageal varices  3 cm distal Esophageal ulcer   3 cm Hiatal hernia  Portal Gastropathy  Colonoscopy: >30 polyps Cecal, Ascending, Descending and Sigmoid colon  AVM's in the Transverse colon  Sigmoid colon diverticulosis  Internal Hemorrhoids  Inverted appendiceal stump    Plan:   Repeat colonoscopy in 6 mo. depending on polyp pathology. When the report is available, we will contact the patient and update the precise follow up interval.    Protonix 40 mg BID  Repeat EGD in 2 mo. Regular diet  Anusol HC RS BID for 6 days    Signed By: Mayur Juan, DO    Please note that some or all of this record was generated using voice recognition software. If there are any questions about the content of this document, please contact the author as some errors in transcription may have occurred.

## 2022-06-22 NOTE — PROGRESS NOTES
Patient awake in room. Ambulates independently. Continues to drink the [facility] for the colonoscopy prep. Patient has not had a BM yet this shift. Continues to c/o abdominal pain, no PRNs available at this time. Call light within reach.

## 2022-06-22 NOTE — FLOWSHEET NOTE
06/21/22 2003   Vital Signs   Temp 97 °F (36.1 °C)   Temp Source Oral   Heart Rate 78   Heart Rate Source Monitor   Resp 18   /85   BP Location Left upper arm   BP Method Automatic   MAP (Calculated) 97   Patient Position Sitting   Level of Consciousness Alert (0)   MEWS Score 1   Oxygen Therapy   SpO2 97 %   O2 Device None (Room air)   Pt A/O sitting on side of bed. Pt drinking and tolerating bowel prep at this time. Assessment completed. Meds given per MAR. Pt aware of NPO status at midnight. Pt denies any needs.  Call light within reach

## 2022-06-22 NOTE — PROGRESS NOTES
Patient c/o abdominal pain PRN Dilaudid given by another RN Yasmin Julio, for said RN. Patient continues to drink the [employer], no BMs at this time. Call light within reach.

## 2022-06-22 NOTE — H&P
[facility]   Procedure H&P    Patient: Maddie Gates MRN: [id number]     YOB: 1965  Age: 49 y.o. Sex: male    Unit: [facility] Room/Bed: ENDO/NONE Location: [address]     Procedure: Procedure(s):  EGD W/ANES. COLON W/ANES. Indication:Rectal bleed  Referring  Physician:        Brief history: Hx of Cirrhosis R/O varices    Nurses past medical history notes reviewed and agreed. Medications reviewed. Allergies: Patient has no known allergies. Allergies noted: Yes     Past Medical History:   Past Medical History:   Diagnosis Date    Cirrhosis ([facility] [address]     Hepatitis C        Past Surgical History:   Past Surgical History:   Procedure Laterality Date    APPENDECTOMY      CHOLECYSTECTOMY      LEG AMPUTATION BELOW KNEE Right     TONSILLECTOMY         Social History:   Social History     Socioeconomic History    Marital status:      Spouse name: Not on file    Number of children: Not on file    Years of education: Not on file    Highest education level: Not on file   Occupational History    Not on file   Tobacco Use    Smoking status: Current Every Day Smoker     Packs/day: 1.00    Smokeless tobacco: Never Used   Vaping Use    Vaping Use: Never used   Substance and Sexual Activity    Alcohol use: Not Currently    Drug use: Yes     Types: Marijuana Charlaine Wilfredo)    Sexual activity: Not on file   Other Topics Concern    Not on file   Social History Narrative    Not on file     Social Determinants of Health     Financial Resource Strain:     Difficulty of Paying Living Expenses: Not on file   Food Insecurity:     Worried About [address] in the Last Year: Not on file    [address] in the Last Year: Not on file   Transportation Needs:     Lack of Transportation (Medical): Not on file    Lack of Transportation (Non-Medical):  Not on file   Physical Activity:     Days of Exercise per Week: Not on file    Minutes of Exercise per Session: Not on file   Stress:     Feeling of Stress : Not on file   Social Connections:     Frequency of Communication with Friends and Family: Not on file    Frequency of Social Gatherings with Friends and Family: Not on file    Attends Confucianism Services: Not on file    Active Member of Clubs or Organizations: Not on file    Attends Club or Organization Meetings: Not on file    Marital Status: Not on file   Intimate Partner Violence:     Fear of Current or Ex-Partner: Not on file    Emotionally Abused: Not on file    Physically Abused: Not on file    Sexually Abused: Not on file   Housing Stability:     Unable to Pay for Housing in the Last Year: Not on file    Number of [place] in the Last Year: Not on file    Unstable Housing in the Last Year: Not on file       Family History: History reviewed. No pertinent family history. Home Medications:   Prior to Admission medications    Medication Sig Start Date End Date Taking?  Authorizing Provider   escitalopram (LEXAPRO) 5 MG tablet Take 2.5 mg by mouth 4 times daily   Yes Historical Provider, MD   doxepin (SINEQUAN) 10 MG capsule Take 10 mg by mouth nightly   Yes Historical Provider, MD   lactulose encephalopathy (GENERLAC) 10 GM/15ML SOLN solution Take 30 g by mouth 2 times daily   Yes Historical Provider, MD   propranolol (INDERAL) 10 MG tablet Take 10 mg by mouth 2 times daily   Yes Historical Provider, MD   entecavir (BARACLUDE) 1 MG tablet  6/14/22   Historical Provider, MD   omeprazole (PRILOSEC) 40 MG delayed release capsule TAKE 1 CAPSULE 30 MINUTES BEFORE MORNING MEAL ORALLY TWICE A DAY 30 DAY(S) 6/2/22   Historical Provider, MD   furosemide (LASIX) 40 MG tablet Take 1 tablet by mouth daily 6/18/22   Mustapha Beard MD   levoFLOXacin (LEVAQUIN) 500 MG tablet Take 1 tablet by mouth daily for 5 days 6/18/22 6/23/22  Mustapha Beard MD   omeprazole (PRILOSEC) 20 MG delayed release capsule Take 20 mg by mouth daily    Historical Provider, MD   tenofovir disoproxil fumarate (VIREAD) 300 MG tablet Take 300 mg by mouth daily    Historical Provider, MD   folic acid (FOLVITE) 1 MG tablet Take 1 mg by mouth daily    Historical Provider, MD   spironolactone (ALDACTONE) 50 MG tablet Take 50 mg by mouth daily    Historical Provider, MD   Thiamine HCl (B-1) 100 MG TABS Take by mouth    Historical Provider, MD       Review of Systems:  Weight Loss: No  Dysphagia: No  Dyspepsia: No    Physical Exam:   Vital Signs: /68   Pulse 75   Temp 96.9 °F (36.1 °C) (Temporal)   Resp 18   Ht 5' 10\" (1.778 m)   Wt 195 lb (88.5 kg)   SpO2 93%   BMI 27.98 kg/m²   Vital signs reviewed:Yes    HEENT:Normal  Cardiac:Normal  Chest:Normal  Abdomen:Normal  Exts: Normal  Neuro:Normal    Labs:  Admission on 06/20/2022   Component Date Value Ref Range Status    Lactic Acid, Sepsis 06/20/2022 1.9  0.4 - 1.9 mmol/L Final    WBC 06/20/2022 7.2  4.0 - 11.0 K/uL Final    RBC 06/20/2022 3.85* 4.20 - 5.90 M/uL Final    Hemoglobin 06/20/2022 13.7  13.5 - 17.5 g/dL Final    Hematocrit 06/20/2022 39.7* 40.5 - 52.5 % Final    MCV 06/20/2022 103.2* 80.0 - 100.0 fL Final    MCH 06/20/2022 35.5* 26.0 - 34.0 pg Final    MCHC 06/20/2022 34.4  31.0 - 36.0 g/dL Final    RDW 06/20/2022 16.3* 12.4 - 15.4 % Final    Platelets 45/28/6522 232  135 - 450 K/uL Final    MPV 06/20/2022 8.2  5.0 - 10.5 fL Final    Neutrophils % 06/20/2022 62.6  % Final    Lymphocytes % 06/20/2022 21.8  % Final    Monocytes % 06/20/2022 12.8  % Final    Eosinophils % 06/20/2022 1.5  % Final    Basophils % 06/20/2022 1.3  % Final    Neutrophils Absolute 06/20/2022 4.5  1.7 - 7.7 K/uL Final    Lymphocytes Absolute 06/20/2022 1.6  1.0 - 5.1 K/uL Final    Monocytes Absolute 06/20/2022 0.9  0.0 - 1.3 K/uL Final    Eosinophils Absolute 06/20/2022 0.1  0.0 - 0.6 K/uL Final    Basophils Absolute 06/20/2022 0.1  0.0 - 0.2 K/uL Final    Sodium 06/20/2022 137  136 - 145 mmol/L Final    Potassium reflex Magnesium 06/20/2022 4.4  3.5 - 5.1 mmol/L Final    Chloride 06/20/2022 104  99 - 110 mmol/L Final    CO2 06/20/2022 21  21 - 32 mmol/L Final    Anion Gap 06/20/2022 12  3 - 16 Final    Glucose 06/20/2022 116* 70 - 99 mg/dL Final    BUN 06/20/2022 10  7 - 20 mg/dL Final    CREATININE 06/20/2022 1.1  0.9 - 1.3 mg/dL Final    GFR Non- 06/20/2022 >60  >60 Final    GFR  06/20/2022 >60  >60 Final    Calcium 06/20/2022 9.6  8.3 - 10.6 mg/dL Final    Total Protein 06/20/2022 7.8  6.4 - 8.2 g/dL Final    Albumin 06/20/2022 2.8* 3.4 - 5.0 g/dL Final    Albumin/Globulin Ratio 06/20/2022 0.6* 1.1 - 2.2 Final    Total Bilirubin 06/20/2022 1.1* 0.0 - 1.0 mg/dL Final    Alkaline Phosphatase 06/20/2022 120  40 - 129 U/L Final    ALT 06/20/2022 24  10 - 40 U/L Final    AST 06/20/2022 115* 15 - 37 U/L Final    Lipase 06/20/2022 132.0* 13.0 - 60.0 U/L Final    Troponin 06/20/2022 <0.01  <0.01 ng/mL Final    Ventricular Rate 06/20/2022 79  BPM Final    Atrial Rate 06/20/2022 79  BPM Final    P-R Interval 06/20/2022 170  ms Final    QRS Duration 06/20/2022 86  ms Final    Q-T Interval 06/20/2022 396  ms Final    QTc Calculation (Bazett) 06/20/2022 454  ms Final    P Axis 06/20/2022 37  degrees Final    R Axis 06/20/2022 -49  degrees Final    T Axis 06/20/2022 45  degrees Final    Diagnosis 06/20/2022 Normal sinus rhythmLeft axis deviationLeft anterior fascicular blockNonspecific ST abnormalityWhen compared with ECG of 16-JUN-2022 18:06,No significant change was foundConfirmed by RYAN Whitaker MD ([id number]) on 6/21/2022 7:25:34 AM   Final    Pro-BNP 06/20/2022 142* 0 - 124 pg/mL Final    SARS-CoV-2, NAAT 06/20/2022 Not Detected  Not Detected Final    Ammonia 06/20/2022 50  16 - 60 umol/L Final    Protime 06/20/2022 14.6* 11.7 - 14.5 sec Final    INR 06/20/2022 1.14  0.87 - 1.14 Final    Occult Blood Diagnostic 06/20/2022 *  Final                    Value:Result: POSITIVE  Normal range: Negative      Color, UA 06/20/2022 Yellow  Straw/Yellow Final    Clarity, UA 06/20/2022 Clear  Clear Final    Glucose, Ur 06/20/2022 Negative  Negative mg/dL Final    Bilirubin Urine 06/20/2022 Negative  Negative Final    Ketones, Urine 06/20/2022 Negative  Negative mg/dL Final    Specific Gravity, UA 06/20/2022 1.010  1.005 - 1.030 Final    Blood, Urine 06/20/2022 Negative  Negative Final    pH, UA 06/20/2022 7.5  5.0 - 8.0 Final    Protein, UA 06/20/2022 Negative  Negative mg/dL Final    Urobilinogen, Urine 06/20/2022 2.0* <2.0 E.U./dL Final    Nitrite, Urine 06/20/2022 Negative  Negative Final    Leukocyte Esterase, Urine 06/20/2022 Negative  Negative Final    Microscopic Examination 06/20/2022 Not Indicated   Final    Urine Type 06/20/2022 NotGiven   Final    Urine Reflex to Culture 06/20/2022 Not Indicated   Final    Hemoglobin 06/21/2022 13.3* 13.5 - 17.5 g/dL Final    Hematocrit 06/21/2022 39.4* 40.5 - 52.5 % Final    Iron 06/21/2022 62  59 - 158 ug/dL Final    TIBC 06/21/2022 302  260 - 445 ug/dL Final    Iron Saturation 06/21/2022 21  20 - 50 % Final    Sodium 06/21/2022 136  136 - 145 mmol/L Final    Potassium reflex Magnesium 06/21/2022 3.9  3.5 - 5.1 mmol/L Final    Chloride 06/21/2022 103  99 - 110 mmol/L Final    CO2 06/21/2022 21  21 - 32 mmol/L Final    Anion Gap 06/21/2022 12  3 - 16 Final    Glucose 06/21/2022 98  70 - 99 mg/dL Final    BUN 06/21/2022 10  7 - 20 mg/dL Final    CREATININE 06/21/2022 1.1  0.9 - 1.3 mg/dL Final    GFR Non- 06/21/2022 >60  >60 Final    GFR  06/21/2022 >60  >60 Final    Calcium 06/21/2022 9.4  8.3 - 10.6 mg/dL Final    Total Protein 06/21/2022 7.6  6.4 - 8.2 g/dL Final    Albumin 06/21/2022 3.1* 3.4 - 5.0 g/dL Final    Albumin/Globulin Ratio 06/21/2022 0.7* 1.1 - 2.2 Final    Total Bilirubin 06/21/2022 1.3* 0.0 - 1.0 mg/dL Final    Alkaline Phosphatase 06/21/2022 120  40 - 129 U/L Final    ALT 06/21/2022 24  10 - 40 U/L Final    AST 06/21/2022 107* 15 - 37 U/L Final    WBC 06/21/2022 6.5  4.0 - 11.0 K/uL Final    RBC 06/21/2022 3.61* 4.20 - 5.90 M/uL Final    Hemoglobin 06/21/2022 12.9* 13.5 - 17.5 g/dL Final    Hematocrit 06/21/2022 38.0* 40.5 - 52.5 % Final    MCV 06/21/2022 105.2* 80.0 - 100.0 fL Final    MCH 06/21/2022 35.8* 26.0 - 34.0 pg Final    MCHC 06/21/2022 34.0  31.0 - 36.0 g/dL Final    RDW 06/21/2022 16.1* 12.4 - 15.4 % Final    Platelets 35/71/5683 220  135 - 450 K/uL Final    MPV 06/21/2022 8.3  5.0 - 10.5 fL Final    Neutrophils % 06/21/2022 56.2  % Final    Lymphocytes % 06/21/2022 25.4  % Final    Monocytes % 06/21/2022 14.9  % Final    Eosinophils % 06/21/2022 2.5  % Final    Basophils % 06/21/2022 1.0  % Final    Neutrophils Absolute 06/21/2022 3.6  1.7 - 7.7 K/uL Final    Lymphocytes Absolute 06/21/2022 1.6  1.0 - 5.1 K/uL Final    Monocytes Absolute 06/21/2022 1.0  0.0 - 1.3 K/uL Final    Eosinophils Absolute 06/21/2022 0.2  0.0 - 0.6 K/uL Final    Basophils Absolute 06/21/2022 0.1  0.0 - 0.2 K/uL Final    Hemoglobin 06/21/2022 14.3  13.5 - 17.5 g/dL Final    Hematocrit 06/21/2022 42.3  40.5 - 52.5 % Final    Hemoglobin 06/21/2022 13.5  13.5 - 17.5 g/dL Final    Hematocrit 06/21/2022 40.1* 40.5 - 52.5 % Final    Hemoglobin 06/22/2022 12.9* 13.5 - 17.5 g/dL Final    Hematocrit 06/22/2022 38.3* 40.5 - 52.5 % Final   Admission on 06/16/2022, Discharged on 06/18/2022   Component Date Value Ref Range Status    WBC 06/16/2022 6.2  4.0 - 11.0 K/uL Final    RBC 06/16/2022 3.84* 4.20 - 5.90 M/uL Final    Hemoglobin 06/16/2022 13.7  13.5 - 17.5 g/dL Final    Hematocrit 06/16/2022 39.9* 40.5 - 52.5 % Final    MCV 06/16/2022 104.0* 80.0 - 100.0 fL Final    MCH 06/16/2022 35.7* 26.0 - 34.0 pg Final    MCHC 06/16/2022 34.3  31.0 - 36.0 g/dL Final    RDW 06/16/2022 16.6* 12.4 - 15.4 % Final    Platelets 06/36/3064 210  135 - 450 K/uL Final    MPV 06/16/2022 8.5  5.0 - 10.5 fL Final    Neutrophils % 06/16/2022 60.6  % Final    Lymphocytes % 06/16/2022 25.0  % Final    Monocytes % 06/16/2022 11.9  % Final    Eosinophils % 06/16/2022 1.1  % Final    Basophils % 06/16/2022 1.4  % Final    Neutrophils Absolute 06/16/2022 3.7  1.7 - 7.7 K/uL Final    Lymphocytes Absolute 06/16/2022 1.5  1.0 - 5.1 K/uL Final    Monocytes Absolute 06/16/2022 0.7  0.0 - 1.3 K/uL Final    Eosinophils Absolute 06/16/2022 0.1  0.0 - 0.6 K/uL Final    Basophils Absolute 06/16/2022 0.1  0.0 - 0.2 K/uL Final    Sodium 06/16/2022 138  136 - 145 mmol/L Final    Potassium 06/16/2022 5.1  3.5 - 5.1 mmol/L Final    Chloride 06/16/2022 101  99 - 110 mmol/L Final    CO2 06/16/2022 25  21 - 32 mmol/L Final    Anion Gap 06/16/2022 12  3 - 16 Final    Glucose 06/16/2022 101* 70 - 99 mg/dL Final    BUN 06/16/2022 13  7 - 20 mg/dL Final    CREATININE 06/16/2022 1.3  0.9 - 1.3 mg/dL Final    GFR Non- 06/16/2022 57* >60 Final    GFR  06/16/2022 >60  >60 Final    Calcium 06/16/2022 9.1  8.3 - 10.6 mg/dL Final    Total Protein 06/16/2022 8.0  6.4 - 8.2 g/dL Final    Albumin 06/16/2022 2.9* 3.4 - 5.0 g/dL Final    Albumin/Globulin Ratio 06/16/2022 0.6* 1.1 - 2.2 Final    Total Bilirubin 06/16/2022 1.3* 0.0 - 1.0 mg/dL Final    Alkaline Phosphatase 06/16/2022 123  40 - 129 U/L Final    ALT 06/16/2022 27  10 - 40 U/L Final    AST 06/16/2022 145* 15 - 37 U/L Final    Protime 06/16/2022 15.4* 11.7 - 14.5 sec Final    INR 06/16/2022 1.23* 0.87 - 1.14 Final    Lipase 06/16/2022 90.0* 13.0 - 60.0 U/L Final    Lactic Acid 06/16/2022 1.6  0.4 - 2.0 mmol/L Final    Magnesium 06/16/2022 1.90  1.80 - 2.40 mg/dL Final    Ammonia 06/16/2022 80* 16 - 60 umol/L Final    Color, UA 06/16/2022 Yellow  Straw/Yellow Final    Clarity, UA 06/16/2022 Clear  Clear Final    Glucose, Ur 06/16/2022 Negative  Negative mg/dL Final    Bilirubin Urine 06/16/2022 Negative  Negative Final    Ketones, Urine 06/16/2022 Negative  Negative mg/dL Final    Specific Gravity, UA 06/16/2022 <=1.005  1.005 - 1.030 Final    Blood, Urine 06/16/2022 Negative  Negative Final    pH, UA 06/16/2022 7.5  5.0 - 8.0 Final    Protein, UA 06/16/2022 Negative  Negative mg/dL Final    Urobilinogen, Urine 06/16/2022 1.0  <2.0 E.U./dL Final    Nitrite, Urine 06/16/2022 Negative  Negative Final    Leukocyte Esterase, Urine 06/16/2022 Negative  Negative Final    Microscopic Examination 06/16/2022 Not Indicated   Final    Urine Type 06/16/2022 NotGiven   Final    Urine Reflex to Culture 06/16/2022 Not Indicated   Final    Ventricular Rate 06/16/2022 91  BPM Final    Atrial Rate 06/16/2022 91  BPM Final    P-R Interval 06/16/2022 154  ms Final    QRS Duration 06/16/2022 86  ms Final    Q-T Interval 06/16/2022 400  ms Final    QTc Calculation (Bazett) 06/16/2022 492  ms Final    P Axis 06/16/2022 31  degrees Final    R Axis 06/16/2022 -44  degrees Final    T Axis 06/16/2022 26  degrees Final    Diagnosis 06/16/2022 Normal sinus rhythmLeft axis deviationNonspecific ST abnormalityAbnormal ECGNo previous ECGs availableConfirmed by RYAN COPE MD ([id number]) on 6/17/2022 7:22:00 AM   Final    SARS-CoV-2, NAAT 06/16/2022 Not Detected  Not Detected Final    Sodium 06/17/2022 137  136 - 145 mmol/L Final    Potassium reflex Magnesium 06/17/2022 3.5  3.5 - 5.1 mmol/L Final    Chloride 06/17/2022 102  99 - 110 mmol/L Final    CO2 06/17/2022 23  21 - 32 mmol/L Final    Anion Gap 06/17/2022 12  3 - 16 Final    Glucose 06/17/2022 115* 70 - 99 mg/dL Final    BUN 06/17/2022 10  7 - 20 mg/dL Final    CREATININE 06/17/2022 1.2  0.9 - 1.3 mg/dL Final    GFR Non- 06/17/2022 >60  >60 Final    GFR  06/17/2022 >60  >60 Final    Calcium 06/17/2022 8.8  8.3 - 10.6 mg/dL Final    Total Protein 06/17/2022 7.3  6.4 - 8.2 g/dL Final    Albumin 06/17/2022 3.2* 3.4 - 5.0 g/dL Final    Albumin/Globulin Ratio 06/17/2022 0.8* 1.1 - 2.2 Final    Total Bilirubin 06/17/2022 1.4* 0.0 - 1.0 mg/dL Final    Alkaline Phosphatase 06/17/2022 121  40 - 129 U/L Final    ALT 06/17/2022 25  10 - 40 U/L Final    AST 06/17/2022 114* 15 - 37 U/L Final    WBC 06/17/2022 5.6  4.0 - 11.0 K/uL Final    RBC 06/17/2022 3.72* 4.20 - 5.90 M/uL Final    Hemoglobin 06/17/2022 13.2* 13.5 - 17.5 g/dL Final    Hematocrit 06/17/2022 39.2* 40.5 - 52.5 % Final    MCV 06/17/2022 105.2* 80.0 - 100.0 fL Final    MCH 06/17/2022 35.5* 26.0 - 34.0 pg Final    MCHC 06/17/2022 33.7  31.0 - 36.0 g/dL Final    RDW 06/17/2022 16.6* 12.4 - 15.4 % Final    Platelets 73/90/8917 222  135 - 450 K/uL Final    MPV 06/17/2022 7.9  5.0 - 10.5 fL Final    Neutrophils % 06/17/2022 53.3  % Final    Lymphocytes % 06/17/2022 31.3  % Final    Monocytes % 06/17/2022 11.5  % Final    Eosinophils % 06/17/2022 1.8  % Final    Basophils % 06/17/2022 2.1  % Final    Neutrophils Absolute 06/17/2022 3.0  1.7 - 7.7 K/uL Final    Lymphocytes Absolute 06/17/2022 1.7  1.0 - 5.1 K/uL Final    Monocytes Absolute 06/17/2022 0.6  0.0 - 1.3 K/uL Final    Eosinophils Absolute 06/17/2022 0.1  0.0 - 0.6 K/uL Final    Basophils Absolute 06/17/2022 0.1  0.0 - 0.2 K/uL Final    Lactic Acid 06/17/2022 1.9  0.4 - 2.0 mmol/L Final    Troponin 06/17/2022 <0.01  <0.01 ng/mL Final    Magnesium 06/17/2022 2.00  1.80 - 2.40 mg/dL Final    HCV QNT by NAAT IU/ML 06/17/2022 722  IU/mL Final    HCV Qnt by NAAT log IU/ml 06/17/2022 2.86  log IU/mL Final    Interpretation 06/17/2022 Detected* Not Detected Final    HBV Quantitative, IU/ML 06/17/2022 529  IU/mL Final    HBV Quantitative, log IU/ML 06/17/2022 2.72  log IU/mL Final    Interpretation 06/17/2022 Detected* Not Detected Final    Hep B S Ab 06/17/2022 23.41  mIU/mL Final    Hep B S Ag Interp 06/17/2022 Reactive* Non-reactive Final    Hep B E Ag 06/17/2022 Negative  Negative Final    Hep B E Ab 06/17/2022 Negative  Negative Final    Sodium 06/18/2022 139  136 - 145 mmol/L Final    Potassium reflex Magnesium 06/18/2022 3.8  3.5 - 5.1 mmol/L Final    Chloride 06/18/2022 101  99 - 110 mmol/L Final    CO2 06/18/2022 23  21 - 32 mmol/L Final    Anion Gap 06/18/2022 15  3 - 16 Final    Glucose 06/18/2022 109* 70 - 99 mg/dL Final    BUN 06/18/2022 10  7 - 20 mg/dL Final    CREATININE 06/18/2022 1.2  0.9 - 1.3 mg/dL Final    GFR Non- 06/18/2022 >60  >60 Final    GFR  06/18/2022 >60  >60 Final    Calcium 06/18/2022 9.4  8.3 - 10.6 mg/dL Final    Total Protein 06/18/2022 7.8  6.4 - 8.2 g/dL Final    Albumin 06/18/2022 3.2* 3.4 - 5.0 g/dL Final    Albumin/Globulin Ratio 06/18/2022 0.7* 1.1 - 2.2 Final    Total Bilirubin 06/18/2022 1.5* 0.0 - 1.0 mg/dL Final    Alkaline Phosphatase 06/18/2022 128  40 - 129 U/L Final    ALT 06/18/2022 27  10 - 40 U/L Final    AST 06/18/2022 120* 15 - 37 U/L Final    WBC 06/18/2022 5.7  4.0 - 11.0 K/uL Final    RBC 06/18/2022 3.79* 4.20 - 5.90 M/uL Final    Hemoglobin 06/18/2022 13.2* 13.5 - 17.5 g/dL Final    Hematocrit 06/18/2022 39.8* 40.5 - 52.5 % Final    MCV 06/18/2022 105.1* 80.0 - 100.0 fL Final    MCH 06/18/2022 34.9* 26.0 - 34.0 pg Final    MCHC 06/18/2022 33.2  31.0 - 36.0 g/dL Final    RDW 06/18/2022 16.5* 12.4 - 15.4 % Final    Platelets 63/06/0209 213  135 - 450 K/uL Final    MPV 06/18/2022 8.7  5.0 - 10.5 fL Final    Neutrophils % 06/18/2022 58.6  % Final    Lymphocytes % 06/18/2022 25.2  % Final    Monocytes % 06/18/2022 11.8  % Final    Eosinophils % 06/18/2022 1.4  % Final    Basophils % 06/18/2022 3.0  % Final    Neutrophils Absolute 06/18/2022 3.3  1.7 - 7.7 K/uL Final    Lymphocytes Absolute 06/18/2022 1.4  1.0 - 5.1 K/uL Final    Monocytes Absolute 06/18/2022 0.7  0.0 - 1.3 K/uL Final    Eosinophils Absolute 06/18/2022 0.1  0.0 - 0.6 K/uL Final    Basophils Absolute 06/18/2022 0.2  0.0 - 0.2 K/uL Final    Hep B S Ag Confirm 06/17/2022 Positive* Non Confirmed Final        Imaging:  US ABDOMEN LIMITED   Final Result   No ascites for paracentesis      Diffuse hepatocellular disease         CT ABDOMEN PELVIS W IV CONTRAST Additional Contrast? None   Final Result   No acute abnormality. Hepatomegaly with suspected mild steatosis. Splenomegaly could be due to portal hypertension. Sigmoid diverticulosis   without definite acute diverticulitis. XR CHEST PORTABLE   Final Result   No acute process. ASA:3    Mallampati Score: II    Sedation planned:MAC    Patient in acceptable condition for procedure:Yes    12:19 PM 6/22/2022    José Luis Ramirez, DO      Please note that some or all of this record was generated using voice recognition software. If there are any questions about the content of this document, please contact the author as some errors in transcription may have occurred. The patient and I discussed that this is an elective procedure/surgery. We discussed the risks of the procedure/surgery, including but not limited to what is outlined in the signed informed consent. We also discussed the risk of sh[place] COVID 19 while in the facility. We discussed the increased risk of a bad outcome should the patient contract COVID 19 during the post-procedural/post-operative period, given the patients current health condition, chronic conditions, and the added risk of COVID 19 in light of these conditions. The patient and I also discussed the risk of further postponing the procedure/surgery and other treatment alternatives, including non-procedural/surgical treatments. Understanding all of the risks, benefits, and alternatives, the patient made an informed decision to proceed with the procedure/surgery.

## 2022-06-22 NOTE — PLAN OF CARE
Problem: Discharge Planning  Goal: Discharge to home or other facility with appropriate resources  6/22/2022 0249 by Dayne Echols RN  Outcome: Progressing   Patient to  have EGD/Colonoscopy today, and went to ENDO via wheelchair at 1100 today. Possible discharge today after tests are completed.

## 2022-06-22 NOTE — PROGRESS NOTES
Patient still drinking the [facility] for the colonoscopy prep. Patient finally started having BMs about 0130 this morning. Stool is thick, sludge like w/ food particle and solid chunks of stool. Patient has about 800 ml more to drink at this time. Patient continues to c/o abdominal pain 8/10 PRN Norco given. Call light within reach.

## 2022-06-22 NOTE — PROGRESS NOTES
Pt a/o. Am assessment completed see flow sheet. Norco given for abd. And back pain at 1009, and effective. Patient transported to [facility] via wheelchair, with 02 2L NC on. No acute distress noted prior to transport.

## 2022-06-22 NOTE — PLAN OF CARE
Problem: Discharge Planning  Goal: Discharge to home or other facility with appropriate resources  6/22/2022 0249 by Ariel Smith RN  Outcome: Progressing  6/21/2022 1359 by Sidra Flor RN  Outcome: Progressing     Problem: Pain  Goal: Verbalizes/displays adequate comfort level or baseline comfort level  6/22/2022 0249 by Ariel Smith RN  Outcome: Progressing  6/21/2022 1359 by Sidra Flor RN  Outcome: Progressing     Problem: Skin/Tissue Integrity  Goal: Absence of new skin breakdown  Description: 1. Monitor for areas of redness and/or skin breakdown  2. Assess vascular access sites hourly  3. Every 4-6 hours minimum:  Change oxygen saturation probe site  4. Every 4-6 hours:  If on nasal continuous positive airway pressure, respiratory therapy assess nares and determine need for appliance change or resting period.   6/22/2022 0249 by Ariel Smith RN  Outcome: Progressing  6/21/2022 1359 by Sidra Flor RN  Outcome: Progressing     Problem: Safety - Adult  Goal: Free from fall injury  6/22/2022 0249 by Ariel Smith RN  Outcome: Progressing  6/21/2022 1359 by Sidra Flor RN  Outcome: Progressing  Flowsheets (Taken 6/21/2022 1356)  Free From Fall Injury: Instruct family/caregiver on patient safety

## 2022-06-22 NOTE — CARE COORDINATION
INTERDISCIPLINARY PLAN OF CARE CONFERENCE    Date/Time: 6/22/2022 1:54 PM  Completed by: Emy Callahan RN, Case Management      Patient Name:  Maryann Lundy  YOB: 1965  Admitting Diagnosis: Shortness of breath [R06.02]  Generalized abdominal pain [R10.84]  BRBPR (bright red blood per rectum) [F85.0]  Alcoholic cirrhosis of liver with ascites ([facility] [address] [K70.31]  Fatigue, unspecified type [R53.83]     Admit Date/Time:  6/20/2022  4:56 PM    Chart reviewed. Interdisciplinary team contacted or reviewed plan related to patient progress and discharge plans. Disciplines included Case Management, Nursing, and Dietitian. Current Status: 6/20/22 inpt  PT/OT recommendation for discharge plan of care: N/A    Expected D/C Disposition:  Rehab  Confirmed plan with patient  Yes    Discharge Plan Comments:  Reviewed chart. Noted pt out of room for EGD. Noted pt plan is return to [address] [place] [address] with [facility] who states [place] can return [place]. Will cont to follow for [place] needs.       Home O2 in place on admit: No  Pt informed of need to bring portable home O2 tank on day of discharge for nursing to connect prior to leaving:  Not Indicated  Verbalized agreement/Understanding:  Not Indicated

## 2022-06-22 NOTE — PROGRESS NOTES
Patient w/ a large watery BM that is yellow in color, cloudy, contains food pieces and small pieces of stool. Patient continues to be NPO. Call light within reach.

## 2022-06-22 NOTE — PROGRESS NOTES
Transport here to take pt back to floor. Pt in stable condition at this time. Pt denies pain or other needs. CMU can see pt on monitor.

## 2022-06-22 NOTE — ANESTHESIA POSTPROCEDURE EVALUATION
Department of Anesthesiology  Postprocedure Note    Patient: Den Gomes  MRN: [id number]  YOB: 1965  Date of evaluation: 6/22/2022      Procedure Summary     Date: 06/22/22 Room / Location: [address] [place] [address] / [facility]    Anesthesia Start: 1465 Anesthesia Stop: 4565    Procedures:       EGD BIOPSY (N/A )      COLONOSCOPY POLYPECTOMY SNARE/COLD BIOPSY (N/A )      COLONOSCOPY WITH BIOPSY (N/A ) Diagnosis:       Rectal bleeding      Abdominal pain, unspecified abdominal location      (RECTAL BLEEDING, ABDOMINAL PAIN)    Surgeons: Stefanie Slaughter DO Responsible Provider: Anitha Mesa MD    Anesthesia Type: MAC ASA Status: 3          Anesthesia Type: No value filed.     Aria Phase I: Aria Score: 10    Aria Phase II: Aria Score: 10    Last vitals:   Vitals Value Taken Time   /69 06/22/22 1412   Temp 97.1 °F (36.2 °C) 06/22/22 1335   Pulse 74 06/22/22 1412   Resp 16 06/22/22 1412   SpO2 94 % 06/22/22 1412         Anesthesia Post Evaluation    Patient location during evaluation: PACU  Level of consciousness: awake  Airway patency: patent  Nausea & Vomiting: no nausea  Complications: no  Cardiovascular status: blood pressure returned to baseline  Respiratory status: acceptable  Hydration status: euvolemic

## 2022-06-22 NOTE — PROGRESS NOTES
Patient continues to drink the last bit of the [place]. Another large BM, watery, tan, cloudy, stool particles present. Call light within reach.

## 2022-06-22 NOTE — PROGRESS NOTES
RT Inhaler-Nebulizer Bronchodilator Protocol Note    There is a bronchodilator order in the chart from a provider indicating to follow the RT Bronchodilator Protocol and there is an Initiate RT Inhaler-Nebulizer Bronchodilator Protocol order as well (see protocol at bottom of note). CXR Findings:  XR CHEST PORTABLE    Result Date: 6/20/2022  No acute process. The findings from the last RT Protocol Assessment were as follows:   History Pulmonary Disease: (P) Smoker 15 pack years or more  Respiratory Pattern: (P) Regular pattern and RR 12-20 bpm  Breath Sounds: (P) Slightly diminished and/or crackles  Cough: (P) Strong, spontaneous, non-productive  Indication for Bronchodilator Therapy: (P) On home bronchodilators  Bronchodilator Assessment Score: (P) 3    Aerosolized bronchodilator medication orders have been revised according to the RT Inhaler-Nebulizer Bronchodilator Protocol below. Respiratory Therapist to perform RT Therapy Protocol Assessment initially then follow the protocol. Repeat RT Therapy Protocol Assessment PRN for score 0-3 or on second treatment, BID, and PRN for scores above 3. No Indications - adjust the frequency to every 6 hours PRN wheezing or bronchospasm, if no treatments needed after 48 hours then discontinue using Per Protocol order mode. If indication present, adjust the RT bronchodilator orders based on the Bronchodilator Assessment Score as indicated below. Use Inhaler orders unless patient has one or more of the following: on home nebulizer, not able to hold breath for 10 seconds, is not alert and oriented, cannot activate and use MDI correctly, or respiratory rate 25 breaths per minute or more, then use the equivalent nebulizer order(s) with same Frequency and PRN reasons based on the score. If a patient is on this medication at home then do not decrease Frequency below that used at home.     0-3 - enter or revise RT bronchodilator order(s) to equivalent RT Bronchodilator order with Frequency of every 4 hours PRN for wheezing or increased work of breathing using Per Protocol order mode. 4-6 - enter or revise RT Bronchodilator order(s) to two equivalent RT bronchodilator orders with one order with BID Frequency and one order with Frequency of every 4 hours PRN wheezing or increased work of breathing using Per Protocol order mode. 7-10 - enter or revise RT Bronchodilator order(s) to two equivalent RT bronchodilator orders with one order with TID Frequency and one order with Frequency of every 4 hours PRN wheezing or increased work of breathing using Per Protocol order mode. 11-13 - enter or revise RT Bronchodilator order(s) to one equivalent RT bronchodilator order with QID Frequency and an Albuterol order with Frequency of every 4 hours PRN wheezing or increased work of breathing using Per Protocol order mode. Greater than 13 - enter or revise RT Bronchodilator order(s) to one equivalent RT bronchodilator order with every 4 hours Frequency and an Albuterol order with Frequency of every 2 hours PRN wheezing or increased work of breathing using Per Protocol order mode.          Electronically signed by Rahat Bello RCP on 6/22/2022 at 8:32 AM

## 2022-06-22 NOTE — PROGRESS NOTES
Patient returned to room via wheelchair from outpatient department after EGD/Colonoscopy. Patient wants to go home so he can smoke.

## 2022-06-22 NOTE — PROGRESS NOTES
Discharge instructions given. Pt verbalized understanding/ denies questions/ needs at this time. Transport from [facility]   called to transport pt, and patient escorted ambulatory to vehicle for discharge home.

## 2022-06-27 NOTE — RESULT ENCOUNTER NOTE
Polyps were consistent with adenomatous polyps on the right side of the colon most of the left colon polyps were hyperplastic however there were so many polyps I am recommending that we continue with the plan to repeat the colonoscopy in 6 months. He needs an upper endoscopy in 2 months to be sure that his esophageal ulcer is healed as well as to evaluate his varices for possible banding.

## 2022-07-11 NOTE — DISCHARGE SUMMARY
Name:  Ce Cortes  Room:  0214/0214-01  MRN:    [id number]    IM Discharge Summary    Discharging Physician:  Elizabeth Knox MD    Admit: 6/20/2022    Discharge:  6/22/2022    PCP      None Provider    Diagnoses and hospital course  this Admission      RBPR, hemoccult +, HGB currently 13.7, last was 13.2 on 6/18/22. Gi consulted, EGD with findings of portal gastropathy but colonoscopy with internal hemorrhoids, diverticulosis and AVM in transverse colon , several polyps       Bleeding likely from internal hemorrhoids - given anusol suppository at dc     Colon polyps - several >30 noted -Repeat colonoscopy in 6 mo. depending on polyp pathology    Abd pain, likely 2/2 distension  No significant ascites noted per IR on US  . Elevated lipase but exam not c/w pancreatitis. Recent admit for PNA/ETOH, cont home levofloxacin x 3 days. Cirrhosis, decompensated, cont home regimen. Cont home antivirals for hep C. Hx ETOH abuse in remission. Cont home regimen. GERD, cont PPI. Tobacco Abuse, counseled cessation, 21 mg nicotine patch.      Dc home with GI follow up     HPI   49 y.o. male with PMH below, presents with BRBPR, abd distension pain, early satiety, decreased appetitie. Pt reports he has Hx of cirrhosis (Hep C currently under Tx, ETOH in remission). Cirrhosis was Dx about 1.5 yrs ago. Pt reports that he last underwent para ~ 2 wks ago and 2L was removed. He says he feels like he needs drained again. He reports that his abd has been uncomfortable but is c/w previous when he needed para. His belly feels tight and under pressure. He has had 2 episodes of BRBPR over the last 24h. He denies Hx of GI bleeding in the past.  Of note he was recently DC from this facility on 6/18 after being treated for PNA and ETOH detox. He has 2d left on his levofloxacin. He presented to [facility] from [facility] rehab facility.        Physical Exam at Discharge:        General:  Middle aged male,   Awake, alert and oriented. Appears to be not in any distress  Mucous Membranes:  Pink , anicteric  Neck: No JVD, no carotid bruit, no thyromegaly  Chest:  Clear to auscultation bilaterally, no added sounds  Cardiovascular:  RRR S1S2 heard, no murmurs or gallops  Abdomen:  Soft, slightly distended, non tender, no organomegaly, BS present  Extremities: previous Right BKA  No edema or cyanosis. Distal pulses well felt  Neurological : grossly normal        Radiology (Please Review Full Report for Details)       US abd    No ascites for paracentesis       Diffuse hepatocellular disease           Consults:    1. GI       Procedures    Findings:   EGD: small to Medium Esophageal varices  3 cm distal Esophageal ulcer   3 cm Hiatal hernia  Portal Gastropathy  Colonoscopy: >30 polyps Cecal, Ascending, Descending and Sigmoid colon  AVM's in the Transverse colon  Sigmoid colon diverticulosis  Internal Hemorrhoids  Inverted appendiceal stump                  No results for input(s): WBC, HGB, HCT, MCV, PLT in the last 72 hours. No results for input(s): NA, K, CL, CO2, PHOS, BUN, CREATININE, CA in the last 72 hours.     CBC:  Lab Results   Component Value Date/Time    WBC 6.5 06/21/2022 05:24 AM    HGB 12.9 06/22/2022 05:21 AM    HCT 38.3 06/22/2022 05:21 AM    .2 06/21/2022 05:24 AM     06/21/2022 05:24 AM    NEUTOPHILPCT 56.2 06/21/2022 05:24 AM    LYMPHOPCT 25.4 06/21/2022 05:24 AM    MONOPCT 14.9 06/21/2022 05:24 AM    BASOPCT 1.0 06/21/2022 05:24 AM    NEUTROABS 3.6 06/21/2022 05:24 AM    LYMPHSABS 1.6 06/21/2022 05:24 AM    MONOSABS 1.0 06/21/2022 05:24 AM    EOSABS 0.2 06/21/2022 05:24 AM    BASOSABS 0.1 06/21/2022 05:24 AM     BNP:   Lab Results   Component Value Date/Time     06/21/2022 05:24 AM    K 3.9 06/21/2022 05:24 AM    CO2 21 06/21/2022 05:24 AM    BUN 10 06/21/2022 05:24 AM    CREATININE 1.1 06/21/2022 05:24 AM    CALCIUM 9.4 06/21/2022 05:24 AM                Medication List      CHANGE how you take these medications    omeprazole 40 MG delayed release capsule  Commonly known as: PRILOSEC  What changed: Another medication with the same name was removed. Continue taking this medication, and follow the directions you see here. CONTINUE taking these medications    B-1 100 MG Tabs     doxepin 10 MG capsule  Commonly known as: SINEQUAN     entecavir 1 MG tablet  Commonly known as: BARACLUDE     folic acid 1 MG tablet  Commonly known as: FOLVITE     furosemide 40 MG tablet  Commonly known as: LASIX  Take 1 tablet by mouth daily     Generlac 10 GM/15ML Soln solution  Generic drug: lactulose encephalopathy     Lexapro 5 MG tablet  Generic drug: escitalopram     propranolol 10 MG tablet  Commonly known as: INDERAL     spironolactone 50 MG tablet  Commonly known as: ALDACTONE     tenofovir disoproxil fumarate 300 MG tablet  Commonly known as: VIREAD        ASK your doctor about these medications    hydrocortisone 25 MG suppository  Commonly known as: ANUSOL-HC  Place 1 suppository rectally 2 times daily for 6 days  Ask about: Should I take this medication?     levoFLOXacin 500 MG tablet  Commonly known as: LEVAQUIN  Take 1 tablet by mouth daily for 5 days  Ask about: Should I take this medication? Where to Get Your Medications      These medications were sent to [address] [facility], [address] [place] [address] [place] [address]    Phone: [phone]   · hydrocortisone 25 MG suppository           Discharge Condition/Location: stable/ home     Follow Up:    PCP in 1 weeks      Time Spent on discharge is more than 28 minutes in the examination, evaluation, counseling and review of medications and discharge plan.       Bruce Holguin MD, 7/11/2022 12:45 PM

## 2023-09-11 ENCOUNTER — HOSPITAL ENCOUNTER (INPATIENT)
Dept: HOSPITAL 22 - ER | Age: 58
LOS: 3 days | Discharge: HOME | DRG: 371 | End: 2023-09-14
Payer: MEDICARE

## 2023-09-11 VITALS
BODY MASS INDEX: 27.6 KG/M2 | BODY MASS INDEX: 27.2 KG/M2 | BODY MASS INDEX: 28.6 KG/M2 | BODY MASS INDEX: 26.9 KG/M2 | BODY MASS INDEX: 170 KG/M2

## 2023-09-11 VITALS
DIASTOLIC BLOOD PRESSURE: 54 MMHG | HEART RATE: 87 BPM | SYSTOLIC BLOOD PRESSURE: 90 MMHG | OXYGEN SATURATION: 94 % | TEMPERATURE: 98.1 F | RESPIRATION RATE: 17 BRPM

## 2023-09-11 VITALS — SYSTOLIC BLOOD PRESSURE: 96 MMHG | OXYGEN SATURATION: 99 % | DIASTOLIC BLOOD PRESSURE: 53 MMHG | HEART RATE: 89 BPM

## 2023-09-11 VITALS
OXYGEN SATURATION: 95 % | DIASTOLIC BLOOD PRESSURE: 78 MMHG | HEART RATE: 89 BPM | RESPIRATION RATE: 20 BRPM | TEMPERATURE: 97.9 F | SYSTOLIC BLOOD PRESSURE: 144 MMHG

## 2023-09-11 VITALS — HEART RATE: 86 BPM | DIASTOLIC BLOOD PRESSURE: 56 MMHG | OXYGEN SATURATION: 92 % | SYSTOLIC BLOOD PRESSURE: 103 MMHG

## 2023-09-11 VITALS
OXYGEN SATURATION: 93 % | TEMPERATURE: 98.1 F | DIASTOLIC BLOOD PRESSURE: 56 MMHG | HEART RATE: 68 BPM | RESPIRATION RATE: 16 BRPM | SYSTOLIC BLOOD PRESSURE: 112 MMHG

## 2023-09-11 VITALS — DIASTOLIC BLOOD PRESSURE: 49 MMHG | HEART RATE: 93 BPM | OXYGEN SATURATION: 91 % | SYSTOLIC BLOOD PRESSURE: 81 MMHG

## 2023-09-11 VITALS
DIASTOLIC BLOOD PRESSURE: 56 MMHG | TEMPERATURE: 98.06 F | RESPIRATION RATE: 16 BRPM | HEART RATE: 68 BPM | SYSTOLIC BLOOD PRESSURE: 112 MMHG | OXYGEN SATURATION: 93 %

## 2023-09-11 VITALS — DIASTOLIC BLOOD PRESSURE: 59 MMHG | SYSTOLIC BLOOD PRESSURE: 99 MMHG | OXYGEN SATURATION: 92 % | HEART RATE: 87 BPM

## 2023-09-11 VITALS — HEART RATE: 92 BPM | SYSTOLIC BLOOD PRESSURE: 90 MMHG | DIASTOLIC BLOOD PRESSURE: 49 MMHG | OXYGEN SATURATION: 93 %

## 2023-09-11 VITALS — OXYGEN SATURATION: 94 % | HEART RATE: 86 BPM | DIASTOLIC BLOOD PRESSURE: 53 MMHG | SYSTOLIC BLOOD PRESSURE: 99 MMHG

## 2023-09-11 DIAGNOSIS — I95.89: ICD-10-CM

## 2023-09-11 DIAGNOSIS — F10.21: ICD-10-CM

## 2023-09-11 DIAGNOSIS — K65.2: Primary | ICD-10-CM

## 2023-09-11 DIAGNOSIS — G92.8: ICD-10-CM

## 2023-09-11 DIAGNOSIS — E87.1: ICD-10-CM

## 2023-09-11 DIAGNOSIS — R78.81: ICD-10-CM

## 2023-09-11 DIAGNOSIS — I95.9: ICD-10-CM

## 2023-09-11 DIAGNOSIS — E87.6: ICD-10-CM

## 2023-09-11 DIAGNOSIS — K70.31: ICD-10-CM

## 2023-09-11 DIAGNOSIS — T59.891A: ICD-10-CM

## 2023-09-11 LAB
ALBUMIN LEVEL: 2.3 G/DL (ref 3.5–5)
ALBUMIN/GLOB SERPL: 0.5 {RATIO} (ref 1.1–1.8)
ALP ISO SERPL-ACNC: 101 U/L (ref 38–126)
ALT SERPLBLD-CCNC: 23 U/L (ref 12–78)
AMMONIA: 88 UMOL/L (ref 9–30)
ANION GAP SERPL CALC-SCNC: 11.4 MEQ/L (ref 5–15)
AST SERPL QL: 57 U/L (ref 17–59)
BILIRUB UR STRIP-MCNC: (no result) MG/DL
BILIRUBIN,TOTAL: 2.6 MG/DL (ref 0.2–1.3)
BUN SERPL-MCNC: 11 MG/DL (ref 9–20)
CALCIUM SPEC-MCNC: 8.1 MG/DL (ref 8.4–10.2)
CELLS COUNTED: 100
CHLORIDE SPEC-SCNC: 89 MMOL/L (ref 98–107)
CO2 SERPL-SCNC: 30 MMOL/L (ref 22–30)
COLOR UR: YELLOW
CREAT BLD-SCNC: 0.9 MG/DL (ref 0.66–1.25)
CREATININE CLEARANCE ESTIMATED: 113 ML/MIN (ref 50–200)
DOHLE BOD BLD QL SMEAR: (no result)
ESTIMATED GLOMERULAR FILT RATE: 88 ML/MIN (ref 60–?)
GFR (AFRICAN AMERICAN): 106 ML/MIN (ref 60–?)
GLOBULIN SER CALC-MCNC: 4.4 G/DL (ref 1.3–3.2)
GLUCOSE: 161 MG/DL (ref 74–100)
HCO3 BLDV-SCNC: 25.2 MMOL/L (ref 23–30)
HCT VFR BLD CALC: 32.1 % (ref 42–52)
HGB BLD-MCNC: 10.5 G/DL (ref 14.1–18)
HYPOCHROMIA BLD QL: (no result)
INR PPP: 1.47 (ref 0.9–1.1)
LACTATE SERPL-MCNC: 3.3 MMOL/L (ref 0.7–2.1)
LACTIC ACID FOLLOW UP (RFLX 2): 2.7 MMOL/L (ref 0.7–2.1)
LIPASE: 364 U/L (ref 23–300)
MAGNESIUM: 1.8 MG/DL (ref 1.6–2.3)
MANUAL DIFFERENTIAL: (no result)
MCHC RBC-ENTMCNC: 32.7 G/DL (ref 31.8–35.4)
MCV RBC: 76.8 FL (ref 80–94)
MEAN CORPUSCULAR HEMOGLOBIN: 25.1 PG (ref 27–31.2)
MICRO URNS: (no result)
MONOCYTES NFR PLR MANUAL: 30 %
NEUTROPHILS # PLR AUTO: 37 %
PCO2 BLDV: 28.3 MMOL/L (ref 35–51)
PH BLDV: 7.57 MMOL/L (ref 7.31–7.41)
PH UR: 6 [PH] (ref 5–8.5)
PLATELET # BLD: 159 K/MM3 (ref 142–424)
PO2 BLDV: 143.6 MMOL/L (ref 28–40)
POTASSIUM: 2.4 MMOL/L (ref 3.5–5.1)
PROT SERPL-MCNC: 6.7 G/DL (ref 6.3–8.2)
PT BLD: 15.5 SECONDS (ref 10.1–12.5)
RBC # BLD AUTO: 4.19 M/MM3 (ref 4.6–6.2)
RBC # PLR: < 10 CELLS/UL
RBC #/AREA URNS HPF: (no result) #/HPF (ref 0–3)
REFLEX LACTIC (2 HRS): (no result)
REFLEX LACTIC: (no result)
SODIUM SPEC-SCNC: 128 MMOL/L (ref 136–145)
SP GR UR: 1.01 (ref 1–1.03)
SQUAMOUS URNS QL MICRO: (no result) #/HPF (ref 0–5)
TNC,BODY FLUID: 763 CELLS/UL (ref ?–1000)
UROBILINOGEN UR QL: >=8 EU/DL
VOLUME,BODY FLD.: 60 ML
WBC # BLD AUTO: 7.1 K/MM3 (ref 4.8–10.8)

## 2023-09-11 PROCEDURE — 87186 SC STD MICRODIL/AGAR DIL: CPT

## 2023-09-11 PROCEDURE — 84145 PROCALCITONIN (PCT): CPT

## 2023-09-11 PROCEDURE — 84155 ASSAY OF PROTEIN SERUM: CPT

## 2023-09-11 PROCEDURE — 85025 COMPLETE CBC W/AUTO DIFF WBC: CPT

## 2023-09-11 PROCEDURE — 97163 PT EVAL HIGH COMPLEX 45 MIN: CPT

## 2023-09-11 PROCEDURE — 83735 ASSAY OF MAGNESIUM: CPT

## 2023-09-11 PROCEDURE — 83615 LACTATE (LD) (LDH) ENZYME: CPT

## 2023-09-11 PROCEDURE — 85007 BL SMEAR W/DIFF WBC COUNT: CPT

## 2023-09-11 PROCEDURE — 97165 OT EVAL LOW COMPLEX 30 MIN: CPT

## 2023-09-11 PROCEDURE — 89051 BODY FLUID CELL COUNT: CPT

## 2023-09-11 PROCEDURE — 81001 URINALYSIS AUTO W/SCOPE: CPT

## 2023-09-11 PROCEDURE — 80048 BASIC METABOLIC PNL TOTAL CA: CPT

## 2023-09-11 PROCEDURE — 82140 ASSAY OF AMMONIA: CPT

## 2023-09-11 PROCEDURE — 36415 COLL VENOUS BLD VENIPUNCTURE: CPT

## 2023-09-11 PROCEDURE — 83605 ASSAY OF LACTIC ACID: CPT

## 2023-09-11 PROCEDURE — 85610 PROTHROMBIN TIME: CPT

## 2023-09-11 PROCEDURE — 80053 COMPREHEN METABOLIC PANEL: CPT

## 2023-09-11 PROCEDURE — 49082 ABD PARACENTESIS: CPT

## 2023-09-11 PROCEDURE — 87070 CULTURE OTHR SPECIMN AEROBIC: CPT

## 2023-09-11 PROCEDURE — 87205 SMEAR GRAM STAIN: CPT

## 2023-09-11 PROCEDURE — 82803 BLOOD GASES ANY COMBINATION: CPT

## 2023-09-11 PROCEDURE — 83690 ASSAY OF LIPASE: CPT

## 2023-09-11 PROCEDURE — 99285 EMERGENCY DEPT VISIT HI MDM: CPT

## 2023-09-11 PROCEDURE — 80202 ASSAY OF VANCOMYCIN: CPT

## 2023-09-11 PROCEDURE — 93005 ELECTROCARDIOGRAM TRACING: CPT

## 2023-09-11 PROCEDURE — 87040 BLOOD CULTURE FOR BACTERIA: CPT

## 2023-09-11 PROCEDURE — 87077 CULTURE AEROBIC IDENTIFY: CPT

## 2023-09-11 NOTE — PC.NURSE
Keep order for Levophed in the event the patients pressure requires it. Do not start drip at this time.

## 2023-09-12 VITALS
DIASTOLIC BLOOD PRESSURE: 52 MMHG | HEART RATE: 108 BPM | SYSTOLIC BLOOD PRESSURE: 100 MMHG | OXYGEN SATURATION: 93 % | RESPIRATION RATE: 20 BRPM | TEMPERATURE: 98.42 F

## 2023-09-12 VITALS
DIASTOLIC BLOOD PRESSURE: 54 MMHG | OXYGEN SATURATION: 92 % | TEMPERATURE: 98.8 F | RESPIRATION RATE: 18 BRPM | HEART RATE: 95 BPM | SYSTOLIC BLOOD PRESSURE: 112 MMHG

## 2023-09-12 VITALS
DIASTOLIC BLOOD PRESSURE: 50 MMHG | HEART RATE: 82 BPM | OXYGEN SATURATION: 96 % | RESPIRATION RATE: 16 BRPM | TEMPERATURE: 98.2 F | SYSTOLIC BLOOD PRESSURE: 91 MMHG

## 2023-09-12 VITALS
RESPIRATION RATE: 18 BRPM | TEMPERATURE: 97.8 F | HEART RATE: 107 BPM | SYSTOLIC BLOOD PRESSURE: 117 MMHG | DIASTOLIC BLOOD PRESSURE: 65 MMHG | OXYGEN SATURATION: 95 %

## 2023-09-12 VITALS
OXYGEN SATURATION: 98 % | SYSTOLIC BLOOD PRESSURE: 103 MMHG | RESPIRATION RATE: 16 BRPM | DIASTOLIC BLOOD PRESSURE: 51 MMHG | TEMPERATURE: 98.6 F | HEART RATE: 101 BPM

## 2023-09-12 VITALS
OXYGEN SATURATION: 92 % | HEART RATE: 84 BPM | TEMPERATURE: 98 F | RESPIRATION RATE: 16 BRPM | DIASTOLIC BLOOD PRESSURE: 57 MMHG | SYSTOLIC BLOOD PRESSURE: 91 MMHG

## 2023-09-12 VITALS — HEART RATE: 80 BPM

## 2023-09-12 VITALS — HEART RATE: 90 BPM

## 2023-09-12 VITALS — HEART RATE: 100 BPM

## 2023-09-12 LAB
ALBUMIN LEVEL: 2.3 G/DL (ref 3.5–5)
ALBUMIN/GLOB SERPL: 0.5 {RATIO} (ref 1.1–1.8)
ALP ISO SERPL-ACNC: 116 U/L (ref 38–126)
ALT SERPLBLD-CCNC: 21 U/L (ref 12–78)
ANION GAP SERPL CALC-SCNC: 10.2 MEQ/L (ref 5–15)
ANION GAP SERPL CALC-SCNC: 11.5 MEQ/L (ref 5–15)
AST SERPL QL: 33 U/L (ref 17–59)
BILIRUBIN,TOTAL: 1.9 MG/DL (ref 0.2–1.3)
BUN SERPL-MCNC: 8 MG/DL (ref 9–20)
BUN SERPL-MCNC: 8 MG/DL (ref 9–20)
CALCIUM SPEC-MCNC: 6.9 MG/DL (ref 8.4–10.2)
CALCIUM SPEC-MCNC: 7.6 MG/DL (ref 8.4–10.2)
CHLORIDE SPEC-SCNC: 96 MMOL/L (ref 98–107)
CHLORIDE SPEC-SCNC: 98 MMOL/L (ref 98–107)
CO2 SERPL-SCNC: 25 MMOL/L (ref 22–30)
CO2 SERPL-SCNC: 26 MMOL/L (ref 22–30)
CREAT BLD-SCNC: 0.8 MG/DL (ref 0.66–1.25)
CREAT BLD-SCNC: 0.8 MG/DL (ref 0.66–1.25)
CREATININE CLEARANCE ESTIMATED: 108 ML/MIN (ref 50–200)
CREATININE CLEARANCE ESTIMATED: 126 ML/MIN (ref 50–200)
ESTIMATED GLOMERULAR FILT RATE: 100 ML/MIN (ref 60–?)
ESTIMATED GLOMERULAR FILT RATE: 100 ML/MIN (ref 60–?)
GFR (AFRICAN AMERICAN): 121 ML/MIN (ref 60–?)
GFR (AFRICAN AMERICAN): 121 ML/MIN (ref 60–?)
GLOBULIN SER CALC-MCNC: 4.2 G/DL (ref 1.3–3.2)
GLUCOSE: 116 MG/DL (ref 74–100)
GLUCOSE: 192 MG/DL (ref 74–100)
HCT VFR BLD CALC: 33.3 % (ref 42–52)
HGB BLD-MCNC: 10.2 G/DL (ref 14.1–18)
MAGNESIUM: 1.9 MG/DL (ref 1.6–2.3)
MCHC RBC-ENTMCNC: 30.8 G/DL (ref 31.8–35.4)
MCV RBC: 78.9 FL (ref 80–94)
MEAN CORPUSCULAR HEMOGLOBIN: 24.2 PG (ref 27–31.2)
PLATELET # BLD: 141 K/MM3 (ref 142–424)
POTASSIUM: 2.5 MMOL/L (ref 3.5–5.1)
POTASSIUM: 3.2 MMOL/L (ref 3.5–5.1)
PROT SERPL-MCNC: 6.5 G/DL (ref 6.3–8.2)
RBC # BLD AUTO: 4.22 M/MM3 (ref 4.6–6.2)
SODIUM SPEC-SCNC: 130 MMOL/L (ref 136–145)
SODIUM SPEC-SCNC: 131 MMOL/L (ref 136–145)
WBC # BLD AUTO: 4.4 K/MM3 (ref 4.8–10.8)

## 2023-09-12 NOTE — PC.NURSE
pt has been hypotensive through the night, spoke to hospitalist concerning order for norepinephrine order on mar. he instructed to hold off and monitor bp and may add a time 1 midodrine if needed. pt denies symptoms. reports he has hx of hypotension

## 2023-09-13 VITALS
DIASTOLIC BLOOD PRESSURE: 62 MMHG | OXYGEN SATURATION: 95 % | SYSTOLIC BLOOD PRESSURE: 111 MMHG | RESPIRATION RATE: 18 BRPM | HEART RATE: 102 BPM | TEMPERATURE: 98.6 F

## 2023-09-13 VITALS
HEART RATE: 91 BPM | RESPIRATION RATE: 18 BRPM | TEMPERATURE: 98 F | OXYGEN SATURATION: 95 % | SYSTOLIC BLOOD PRESSURE: 93 MMHG | DIASTOLIC BLOOD PRESSURE: 41 MMHG

## 2023-09-13 VITALS
OXYGEN SATURATION: 93 % | RESPIRATION RATE: 18 BRPM | DIASTOLIC BLOOD PRESSURE: 40 MMHG | SYSTOLIC BLOOD PRESSURE: 93 MMHG | HEART RATE: 86 BPM | TEMPERATURE: 98.9 F

## 2023-09-13 VITALS
RESPIRATION RATE: 16 BRPM | HEART RATE: 90 BPM | SYSTOLIC BLOOD PRESSURE: 94 MMHG | OXYGEN SATURATION: 93 % | DIASTOLIC BLOOD PRESSURE: 44 MMHG | TEMPERATURE: 98.42 F

## 2023-09-13 VITALS
DIASTOLIC BLOOD PRESSURE: 72 MMHG | RESPIRATION RATE: 20 BRPM | SYSTOLIC BLOOD PRESSURE: 135 MMHG | HEART RATE: 106 BPM | TEMPERATURE: 98.24 F | OXYGEN SATURATION: 99 %

## 2023-09-13 VITALS
SYSTOLIC BLOOD PRESSURE: 94 MMHG | TEMPERATURE: 98.6 F | RESPIRATION RATE: 18 BRPM | HEART RATE: 90 BPM | OXYGEN SATURATION: 94 % | DIASTOLIC BLOOD PRESSURE: 41 MMHG

## 2023-09-13 VITALS — HEART RATE: 100 BPM

## 2023-09-13 VITALS — HEART RATE: 90 BPM

## 2023-09-13 LAB
ALBUMIN LEVEL: 2.1 G/DL (ref 3.5–5)
ALBUMIN/GLOB SERPL: 0.5 {RATIO} (ref 1.1–1.8)
ALP ISO SERPL-ACNC: 103 U/L (ref 38–126)
ALT SERPLBLD-CCNC: 16 U/L (ref 12–78)
ANION GAP SERPL CALC-SCNC: 10.3 MEQ/L (ref 5–15)
AST SERPL QL: 32 U/L (ref 17–59)
BILIRUBIN,TOTAL: 1.4 MG/DL (ref 0.2–1.3)
BUN SERPL-MCNC: 8 MG/DL (ref 9–20)
CALCIUM SPEC-MCNC: 7.5 MG/DL (ref 8.4–10.2)
CHLORIDE SPEC-SCNC: 100 MMOL/L (ref 98–107)
CO2 SERPL-SCNC: 26 MMOL/L (ref 22–30)
CREAT BLD-SCNC: 0.7 MG/DL (ref 0.66–1.25)
CREATININE CLEARANCE ESTIMATED: 148 ML/MIN (ref 50–200)
ESTIMATED GLOMERULAR FILT RATE: 117 ML/MIN (ref 60–?)
GFR (AFRICAN AMERICAN): 142 ML/MIN (ref 60–?)
GLOBULIN SER CALC-MCNC: 4 G/DL (ref 1.3–3.2)
GLUCOSE: 126 MG/DL (ref 74–100)
HCT VFR BLD CALC: 33.1 % (ref 42–52)
HGB BLD-MCNC: 10.2 G/DL (ref 14.1–18)
INR PPP: 1.39 (ref 0.9–1.1)
LD, BODY FLUID: 70 IU/L
MAGNESIUM: 1.7 MG/DL (ref 1.6–2.3)
MCHC RBC-ENTMCNC: 30.8 G/DL (ref 31.8–35.4)
MCV RBC: 79 FL (ref 80–94)
MEAN CORPUSCULAR HEMOGLOBIN: 24.4 PG (ref 27–31.2)
PLATELET # BLD: 144 K/MM3 (ref 142–424)
POTASSIUM: 3.3 MMOL/L (ref 3.5–5.1)
PROT SERPL-MCNC: 6.1 G/DL (ref 6.3–8.2)
PT BLD: 14.7 SECONDS (ref 10.1–12.5)
RBC # BLD AUTO: 4.19 M/MM3 (ref 4.6–6.2)
SODIUM SPEC-SCNC: 133 MMOL/L (ref 136–145)
VANCOMYCIN TROUGH SERPL-MCNC: 23.1 UG/ML (ref 5–10)
WBC # BLD AUTO: 3.7 K/MM3 (ref 4.8–10.8)

## 2023-09-13 NOTE — PC.NURSE
Pt A&O x4. Has c/o some discomfort to abdomen today. Medicated per mar. Abdomen ascitic. BS active.  He has ambulated with stand by assistance to BR. Has had 2 stools this shift. Family has visited. Call light within reach.

## 2023-09-14 VITALS
HEART RATE: 108 BPM | OXYGEN SATURATION: 95 % | DIASTOLIC BLOOD PRESSURE: 48 MMHG | SYSTOLIC BLOOD PRESSURE: 93 MMHG | TEMPERATURE: 98.9 F | RESPIRATION RATE: 20 BRPM

## 2023-09-14 VITALS
HEART RATE: 102 BPM | TEMPERATURE: 97.88 F | SYSTOLIC BLOOD PRESSURE: 97 MMHG | DIASTOLIC BLOOD PRESSURE: 45 MMHG | OXYGEN SATURATION: 94 % | RESPIRATION RATE: 16 BRPM

## 2023-09-14 VITALS
HEART RATE: 100 BPM | DIASTOLIC BLOOD PRESSURE: 57 MMHG | SYSTOLIC BLOOD PRESSURE: 108 MMHG | TEMPERATURE: 98.5 F | OXYGEN SATURATION: 93 % | RESPIRATION RATE: 18 BRPM

## 2023-09-14 LAB
ALBUMIN LEVEL: 2.1 G/DL (ref 3.5–5)
ALBUMIN/GLOB SERPL: 0.5 {RATIO} (ref 1.1–1.8)
ALP ISO SERPL-ACNC: 99 U/L (ref 38–126)
ALT SERPLBLD-CCNC: 16 U/L (ref 12–78)
AMMONIA: 26 UMOL/L (ref 9–30)
ANION GAP SERPL CALC-SCNC: 9.1 MEQ/L (ref 5–15)
AST SERPL QL: 31 U/L (ref 17–59)
BILIRUBIN,TOTAL: 1.4 MG/DL (ref 0.2–1.3)
BUN SERPL-MCNC: 7 MG/DL (ref 9–20)
CALCIUM SPEC-MCNC: 7.9 MG/DL (ref 8.4–10.2)
CHLORIDE SPEC-SCNC: 102 MMOL/L (ref 98–107)
CO2 SERPL-SCNC: 25 MMOL/L (ref 22–30)
CREAT BLD-SCNC: 0.8 MG/DL (ref 0.66–1.25)
CREATININE CLEARANCE ESTIMATED: 134 ML/MIN (ref 50–200)
ESTIMATED GLOMERULAR FILT RATE: 100 ML/MIN (ref 60–?)
GFR (AFRICAN AMERICAN): 121 ML/MIN (ref 60–?)
GLOBULIN SER CALC-MCNC: 4 G/DL (ref 1.3–3.2)
GLUCOSE: 114 MG/DL (ref 74–100)
POTASSIUM: 4.1 MMOL/L (ref 3.5–5.1)
PROCALCITONIN: 0.57 NG/ML (ref 0–2)
PROT SERPL-MCNC: 6.1 G/DL (ref 6.3–8.2)
SODIUM SPEC-SCNC: 132 MMOL/L (ref 136–145)

## 2023-09-14 NOTE — SW/DCPLANNER
I discussed w/ patient and family regarding IV medication at time of discharge. After lengthy discussion w/ patient, family and MD the patient has chose to discharge home on PO antibiotics. Patient has no further needs/questions at this time.

## 2023-10-18 NOTE — PLAN OF CARE
Lamotrigine to luis e   Problem: Discharge Planning  Goal: Discharge to home or other facility with appropriate resources  6/18/2022 1025 by Corine Birmingham RN  Outcome: Progressing  6/18/2022 0359 by Jada Lutz RN  Outcome: Progressing  6/18/2022 0027 by Jada Lutz RN  Outcome: Progressing  Flowsheets (Taken 6/17/2022 2141)  Discharge to home or other facility with appropriate resources: Identify barriers to discharge with patient and caregiver     Problem: Pain  Goal: Verbalizes/displays adequate comfort level or baseline comfort level  6/18/2022 1025 by Corine Birmingham RN  Outcome: Progressing  6/18/2022 0359 by Jada Lutz RN  Outcome: Progressing  6/18/2022 0027 by Jada Lutz RN  Outcome: Progressing     Problem: Skin/Tissue Integrity  Goal: Absence of new skin breakdown  Description: 1. Monitor for areas of redness and/or skin breakdown  2. Assess vascular access sites hourly  3. Every 4-6 hours minimum:  Change oxygen saturation probe site  4. Every 4-6 hours:  If on nasal continuous positive airway pressure, respiratory therapy assess nares and determine need for appliance change or resting period.   6/18/2022 1025 by Corine Birmingham RN  Outcome: Progressing  6/18/2022 0359 by Jada Lutz RN  Outcome: Progressing  6/18/2022 0027 by Jada Lutz RN  Outcome: Progressing     Problem: Safety - Adult  Goal: Free from fall injury  6/18/2022 1025 by Corine Birmingham RN  Outcome: Progressing  6/18/2022 0359 by Jada Lutz RN  Outcome: Progressing  6/18/2022 0027 by Jada Lutz RN  Outcome: Progressing     Problem: ABCDS Injury Assessment  Goal: Absence of physical injury  Outcome: Progressing

## (undated) DEVICE — YANKAUER,BULB TIP,W/O VENT,RIGID,STERILE: Brand: MEDLINE

## (undated) DEVICE — ELECTRODE,RADIOTRANSLUCENT,FOAM,3PK: Brand: MEDLINE

## (undated) DEVICE — ENDOSCOPIC KIT 2 12 FT OP4 DE2 GWN SYR

## (undated) DEVICE — FORCEP BX STD CAP 240CM RAD JAW 4

## (undated) DEVICE — ELECTRODE PT RET AD L9FT HI MOIST COND ADH HYDRGEL CORDED

## (undated) DEVICE — CANNULA,OXY,ADULT,SUPERSOFT,W/7'TUB,SC: Brand: MEDLINE INDUSTRIES, INC.

## (undated) DEVICE — SNARE ENDOSCP L240CM SHTH DIA24MM LOOP W10MM POLYP RND REINF

## (undated) DEVICE — Device: Brand: DISPOSABLE ELECTROSURGICAL SNARE

## (undated) DEVICE — CONMED SCOPE SAVER BITE BLOCK, 20X27 MM: Brand: SCOPE SAVER

## (undated) DEVICE — ENDO CARRY-ON PROCEDURE KIT INCLUDES SUCTION TUBING, LUBRICANT, GAUZE, BIOHAZARD STICKER, TRANSPORT PAD AND INTERCEPT BEDSIDE KIT.: Brand: ENDO CARRY-ON PROCEDURE KIT